# Patient Record
Sex: FEMALE | Race: WHITE | NOT HISPANIC OR LATINO | Employment: FULL TIME | URBAN - METROPOLITAN AREA
[De-identification: names, ages, dates, MRNs, and addresses within clinical notes are randomized per-mention and may not be internally consistent; named-entity substitution may affect disease eponyms.]

---

## 2017-03-07 ENCOUNTER — RECORDS - HEALTHEAST (OUTPATIENT)
Dept: LAB | Facility: HOSPITAL | Age: 42
End: 2017-03-07

## 2017-03-07 LAB — HBV SURFACE AB SERPL IA-ACNC: POSITIVE M[IU]/ML

## 2018-02-07 ENCOUNTER — COMMUNICATION - HEALTHEAST (OUTPATIENT)
Dept: SCHEDULING | Facility: CLINIC | Age: 43
End: 2018-02-07

## 2018-03-05 ENCOUNTER — OFFICE VISIT - HEALTHEAST (OUTPATIENT)
Dept: FAMILY MEDICINE | Facility: CLINIC | Age: 43
End: 2018-03-05

## 2018-03-05 ENCOUNTER — COMMUNICATION - HEALTHEAST (OUTPATIENT)
Dept: TELEHEALTH | Facility: CLINIC | Age: 43
End: 2018-03-05

## 2018-03-05 DIAGNOSIS — H90.5 CONGENITAL HEARING LOSS: ICD-10-CM

## 2018-03-05 DIAGNOSIS — Z15.09 BRCA POSITIVE: ICD-10-CM

## 2018-03-05 DIAGNOSIS — Z15.01 BRCA POSITIVE: ICD-10-CM

## 2018-05-01 ENCOUNTER — OFFICE VISIT - HEALTHEAST (OUTPATIENT)
Dept: FAMILY MEDICINE | Facility: CLINIC | Age: 43
End: 2018-05-01

## 2018-05-01 DIAGNOSIS — R23.2 HOT FLASHES: ICD-10-CM

## 2018-05-01 DIAGNOSIS — Z00.00 ROUTINE GENERAL MEDICAL EXAMINATION AT A HEALTH CARE FACILITY: ICD-10-CM

## 2018-05-01 DIAGNOSIS — Z12.31 VISIT FOR SCREENING MAMMOGRAM: ICD-10-CM

## 2018-05-01 DIAGNOSIS — Z23 NEED FOR TDAP VACCINATION: ICD-10-CM

## 2018-05-01 LAB
ALBUMIN UR-MCNC: NEGATIVE MG/DL
APPEARANCE UR: CLEAR
BILIRUB UR QL STRIP: NEGATIVE
CHOLEST SERPL-MCNC: 171 MG/DL
COLOR UR AUTO: YELLOW
FASTING STATUS PATIENT QL REPORTED: NO
GLUCOSE UR STRIP-MCNC: NEGATIVE MG/DL
HDLC SERPL-MCNC: 66 MG/DL
HGB BLD-MCNC: 14.4 G/DL (ref 12–16)
HGB UR QL STRIP: ABNORMAL
KETONES UR STRIP-MCNC: NEGATIVE MG/DL
LDLC SERPL CALC-MCNC: 82 MG/DL
LEUKOCYTE ESTERASE UR QL STRIP: NEGATIVE
NITRATE UR QL: NEGATIVE
PH UR STRIP: 7 [PH] (ref 5–8)
SP GR UR STRIP: 1.01 (ref 1–1.03)
TRIGL SERPL-MCNC: 113 MG/DL
UROBILINOGEN UR STRIP-ACNC: ABNORMAL

## 2018-05-02 ENCOUNTER — COMMUNICATION - HEALTHEAST (OUTPATIENT)
Dept: FAMILY MEDICINE | Facility: CLINIC | Age: 43
End: 2018-05-02

## 2018-05-03 LAB
HPV SOURCE: NORMAL
HUMAN PAPILLOMA VIRUS 16 DNA: NEGATIVE
HUMAN PAPILLOMA VIRUS 18 DNA: NEGATIVE
HUMAN PAPILLOMA VIRUS FINAL DIAGNOSIS: NORMAL
HUMAN PAPILLOMA VIRUS OTHER HR: NEGATIVE
SPECIMEN DESCRIPTION: NORMAL

## 2018-08-06 ENCOUNTER — COMMUNICATION - HEALTHEAST (OUTPATIENT)
Dept: FAMILY MEDICINE | Facility: CLINIC | Age: 43
End: 2018-08-06

## 2018-08-06 DIAGNOSIS — R23.2 HOT FLASHES: ICD-10-CM

## 2018-08-08 ENCOUNTER — COMMUNICATION - HEALTHEAST (OUTPATIENT)
Dept: FAMILY MEDICINE | Facility: CLINIC | Age: 43
End: 2018-08-08

## 2018-08-22 ENCOUNTER — COMMUNICATION - HEALTHEAST (OUTPATIENT)
Dept: FAMILY MEDICINE | Facility: CLINIC | Age: 43
End: 2018-08-22

## 2018-08-22 DIAGNOSIS — Z15.09 BRCA POSITIVE: ICD-10-CM

## 2018-08-22 DIAGNOSIS — Z15.01 BRCA POSITIVE: ICD-10-CM

## 2018-09-12 ENCOUNTER — HOSPITAL ENCOUNTER (OUTPATIENT)
Dept: MAMMOGRAPHY | Facility: CLINIC | Age: 43
Discharge: HOME | End: 2018-09-12
Attending: FAMILY MEDICINE

## 2018-09-12 DIAGNOSIS — Z15.01 BRCA POSITIVE: ICD-10-CM

## 2018-09-12 DIAGNOSIS — Z15.09 BRCA POSITIVE: ICD-10-CM

## 2018-09-12 DIAGNOSIS — Z15.02 GENETIC SUSCEPTIBILITY TO MALIGNANT NEOPLASM OF OVARY: ICD-10-CM

## 2018-11-20 ENCOUNTER — COMMUNICATION - HEALTHEAST (OUTPATIENT)
Dept: FAMILY MEDICINE | Facility: CLINIC | Age: 43
End: 2018-11-20

## 2018-11-20 DIAGNOSIS — R23.2 HOT FLASHES: ICD-10-CM

## 2018-11-28 ENCOUNTER — COMMUNICATION - HEALTHEAST (OUTPATIENT)
Dept: FAMILY MEDICINE | Facility: CLINIC | Age: 43
End: 2018-11-28

## 2019-03-08 ENCOUNTER — COMMUNICATION - HEALTHEAST (OUTPATIENT)
Dept: SCHEDULING | Facility: CLINIC | Age: 44
End: 2019-03-08

## 2019-03-20 ENCOUNTER — COMMUNICATION - HEALTHEAST (OUTPATIENT)
Dept: TELEHEALTH | Facility: CLINIC | Age: 44
End: 2019-03-20

## 2019-03-20 ENCOUNTER — OFFICE VISIT - HEALTHEAST (OUTPATIENT)
Dept: FAMILY MEDICINE | Facility: CLINIC | Age: 44
End: 2019-03-20

## 2019-03-20 DIAGNOSIS — N64.4 BREAST PAIN, RIGHT: ICD-10-CM

## 2019-03-20 DIAGNOSIS — Z15.01 BRCA POSITIVE: ICD-10-CM

## 2019-03-20 DIAGNOSIS — Z15.09 BRCA POSITIVE: ICD-10-CM

## 2019-03-22 ENCOUNTER — HOSPITAL ENCOUNTER (OUTPATIENT)
Dept: ULTRASOUND IMAGING | Facility: CLINIC | Age: 44
Discharge: HOME | End: 2019-03-22
Attending: FAMILY MEDICINE

## 2019-03-22 ENCOUNTER — HOSPITAL ENCOUNTER (OUTPATIENT)
Dept: MAMMOGRAPHY | Facility: CLINIC | Age: 44
Discharge: HOME | End: 2019-03-22
Attending: FAMILY MEDICINE

## 2019-03-22 DIAGNOSIS — Z15.09 BRCA POSITIVE: ICD-10-CM

## 2019-03-22 DIAGNOSIS — Z15.01 BRCA POSITIVE: ICD-10-CM

## 2019-03-22 DIAGNOSIS — N64.4 BREAST PAIN, RIGHT: ICD-10-CM

## 2019-03-23 ENCOUNTER — COMMUNICATION - HEALTHEAST (OUTPATIENT)
Dept: FAMILY MEDICINE | Facility: CLINIC | Age: 44
End: 2019-03-23

## 2019-12-26 ENCOUNTER — COMMUNICATION - HEALTHEAST (OUTPATIENT)
Dept: FAMILY MEDICINE | Facility: CLINIC | Age: 44
End: 2019-12-26

## 2019-12-26 DIAGNOSIS — R23.2 HOT FLASHES: ICD-10-CM

## 2020-01-21 ENCOUNTER — OFFICE VISIT - HEALTHEAST (OUTPATIENT)
Dept: FAMILY MEDICINE | Facility: CLINIC | Age: 45
End: 2020-01-21

## 2020-01-21 DIAGNOSIS — Z00.00 ROUTINE GENERAL MEDICAL EXAMINATION AT A HEALTH CARE FACILITY: ICD-10-CM

## 2020-01-21 DIAGNOSIS — R23.2 HOT FLASHES: ICD-10-CM

## 2020-01-21 DIAGNOSIS — Z15.01 BRCA POSITIVE: ICD-10-CM

## 2020-01-21 DIAGNOSIS — Z15.09 BRCA POSITIVE: ICD-10-CM

## 2020-01-21 DIAGNOSIS — Z12.31 VISIT FOR SCREENING MAMMOGRAM: ICD-10-CM

## 2020-01-21 LAB
ALBUMIN UR-MCNC: NEGATIVE MG/DL
APPEARANCE UR: CLEAR
BILIRUB UR QL STRIP: NEGATIVE
CHOLEST SERPL-MCNC: 198 MG/DL
COLOR UR AUTO: YELLOW
FASTING STATUS PATIENT QL REPORTED: NO
GLUCOSE UR STRIP-MCNC: NEGATIVE MG/DL
HDLC SERPL-MCNC: 81 MG/DL
HGB BLD-MCNC: 15.3 G/DL (ref 12–16)
HGB UR QL STRIP: ABNORMAL
KETONES UR STRIP-MCNC: NEGATIVE MG/DL
LDLC SERPL CALC-MCNC: 97 MG/DL
LEUKOCYTE ESTERASE UR QL STRIP: NEGATIVE
NITRATE UR QL: NEGATIVE
PH UR STRIP: 6 [PH] (ref 5–8)
SP GR UR STRIP: 1.01 (ref 1–1.03)
TRIGL SERPL-MCNC: 99 MG/DL
UROBILINOGEN UR STRIP-ACNC: ABNORMAL

## 2020-01-21 ASSESSMENT — MIFFLIN-ST. JEOR: SCORE: 1427.84

## 2020-01-23 ENCOUNTER — COMMUNICATION - HEALTHEAST (OUTPATIENT)
Dept: FAMILY MEDICINE | Facility: CLINIC | Age: 45
End: 2020-01-23

## 2020-01-29 ENCOUNTER — COMMUNICATION - HEALTHEAST (OUTPATIENT)
Dept: ADMINISTRATIVE | Facility: CLINIC | Age: 45
End: 2020-01-29

## 2020-02-02 ENCOUNTER — COMMUNICATION - HEALTHEAST (OUTPATIENT)
Dept: FAMILY MEDICINE | Facility: CLINIC | Age: 45
End: 2020-02-02

## 2020-02-02 DIAGNOSIS — R23.2 HOT FLASHES: ICD-10-CM

## 2020-09-10 ENCOUNTER — RECORDS - HEALTHEAST (OUTPATIENT)
Dept: ADMINISTRATIVE | Facility: OTHER | Age: 45
End: 2020-09-10

## 2021-02-05 ENCOUNTER — OFFICE VISIT - HEALTHEAST (OUTPATIENT)
Dept: FAMILY MEDICINE | Facility: CLINIC | Age: 46
End: 2021-02-05

## 2021-02-05 DIAGNOSIS — R23.2 HOT FLASHES: ICD-10-CM

## 2021-02-05 DIAGNOSIS — Z15.01 BRCA POSITIVE: ICD-10-CM

## 2021-02-05 DIAGNOSIS — Z15.09 BRCA POSITIVE: ICD-10-CM

## 2021-02-05 DIAGNOSIS — Z00.00 ROUTINE GENERAL MEDICAL EXAMINATION AT A HEALTH CARE FACILITY: ICD-10-CM

## 2021-02-05 DIAGNOSIS — H90.5 CONGENITAL HEARING LOSS: ICD-10-CM

## 2021-02-05 LAB
ALBUMIN UR-MCNC: NEGATIVE MG/DL
APPEARANCE UR: CLEAR
BILIRUB UR QL STRIP: NEGATIVE
CHOLEST SERPL-MCNC: 184 MG/DL
COLOR UR AUTO: YELLOW
FASTING STATUS PATIENT QL REPORTED: NO
GLUCOSE UR STRIP-MCNC: NEGATIVE MG/DL
HDLC SERPL-MCNC: 75 MG/DL
HGB BLD-MCNC: 15.2 G/DL (ref 12–16)
HGB UR QL STRIP: ABNORMAL
KETONES UR STRIP-MCNC: NEGATIVE MG/DL
LDLC SERPL CALC-MCNC: 91 MG/DL
LEUKOCYTE ESTERASE UR QL STRIP: NEGATIVE
NITRATE UR QL: NEGATIVE
PH UR STRIP: 5.5 [PH] (ref 5–8)
SP GR UR STRIP: 1.01 (ref 1–1.03)
TRIGL SERPL-MCNC: 92 MG/DL
UROBILINOGEN UR STRIP-ACNC: ABNORMAL

## 2021-02-05 RX ORDER — MULTIPLE VITAMINS W/ MINERALS TAB 9MG-400MCG
1 TAB ORAL DAILY
Status: SHIPPED | COMMUNITY
Start: 2021-02-05 | End: 2022-04-26

## 2021-02-05 RX ORDER — NORETHINDRONE ACETATE AND ETHINYL ESTRADIOL .02; 1 MG/1; MG/1
TABLET ORAL
Qty: 84 TABLET | Refills: 4 | Status: SHIPPED | OUTPATIENT
Start: 2021-02-05 | End: 2022-02-23

## 2021-02-05 ASSESSMENT — MIFFLIN-ST. JEOR: SCORE: 1411.63

## 2021-02-16 ENCOUNTER — HOSPITAL ENCOUNTER (OUTPATIENT)
Dept: MAMMOGRAPHY | Facility: CLINIC | Age: 46
Discharge: HOME | End: 2021-02-16
Attending: FAMILY MEDICINE

## 2021-02-16 DIAGNOSIS — Z12.31 VISIT FOR SCREENING MAMMOGRAM: ICD-10-CM

## 2021-03-19 ENCOUNTER — COMMUNICATION - HEALTHEAST (OUTPATIENT)
Dept: FAMILY MEDICINE | Facility: CLINIC | Age: 46
End: 2021-03-19

## 2021-03-19 DIAGNOSIS — B00.1 COLD SORE: ICD-10-CM

## 2021-05-26 NOTE — TELEPHONE ENCOUNTER
I called and left message for patient to call back.     I was calling to make sure that she was aware of the results of her recent mammogram which were entirely negative.  I want to make sure that she did not have any other further questions regarding these results.

## 2021-05-27 ENCOUNTER — RECORDS - HEALTHEAST (OUTPATIENT)
Dept: ADMINISTRATIVE | Facility: CLINIC | Age: 46
End: 2021-05-27

## 2021-05-27 NOTE — PROGRESS NOTES
PROGRESS NOTE   3/20/2019    SUBJECTIVE:  Marcelina Mcginnis is a 43 y.o. female  who presents for   Chief Complaint   Patient presents with     Breast Pain     Right breast pain/pressure     Patient comes in today because of some right breast pain.  She had a shooting pain in her right breast about 2 weeks ago.  It happened once and never happened again.  He has not had any other problems with pain in that right breast again but she is concerned because of the fact that she is positive for BRCA and is worried that this might represent a sign of cancer or other breast abnormality.  She notes that she has not had any further pain in her breast but 2 days ago she started feeling some pressure in that right breast.  Again this lasted for a very short time and now is gone.  She totally is feeling absolutely fine at this point but again is concerned over the fact that she is positive for BRCA and she had these breast symptoms.  She has had both of her ovaries removed so she does not have any hormones naturally produced in her body.  She is on Microgestin on a continual basis.  She had a mammogram in November which was entirely normal.    Patient Active Problem List   Diagnosis     BRCA positive     Congenital hearing loss       Current Outpatient Medications   Medication Sig Dispense Refill     MICROGESTIN 1/20, 21, 1-20 mg-mcg per tablet TK 1 T PO ONCE D.  START A NEW PACK Q 3 WEEKS.. 4 Package 4     No current facility-administered medications for this visit.        No Known Allergies    Past Medical History:   Diagnosis Date     BRCA positive     needs yearly mammo, last one done fall 2017     Congenital hearing loss     wears bilateral hearing aids       Past Surgical History:   Procedure Laterality Date     BILATERAL OOPHORECTOMY      due to BRCA positive gene     OOPHORECTOMY         Social History     Tobacco Use   Smoking Status Former Smoker     Packs/day: 0.50     Years: 10.00     Pack years: 5.00   Smokeless  Tobacco Never Used       OBJECTIVE:     BP 99/64   Pulse 73   Temp 98.1  F (36.7  C)   Wt 163 lb (73.9 kg)   SpO2 99%     Physical Exam:  GENERAL APPEARANCE: A&A, NAD, well hydrated, well nourished  SKIN:  Normal skin turgor, no lesions/rashes   Breast exam was done.  No masses were appreciated bilaterally.  No nipple discharge was appreciated.  No axillary masses were appreciated.  No dimpling or pulling of the skin was appreciated with patient sitting upright.  CV: RRR, no M/G/R   LUNGS: CTAB  EXTREMITY: no swelling noted.  Full range of motion of all 4 extremities.   NEURO: no gross deficits   PSYCHIATRIC;  Mood appropriate, memory intact        ASSESSMENT/PLAN:     Breast pain, right [N64.4]      1. Breast pain, right  -right diagnostic mammogram  - US Breast Limited (Focal) Right; Future    2. BRCA positive  -right diagnostic mammogram  - US Breast Limited (Focal) Right; Future    Patient comes in today with some right breast pain that she had one time a couple weeks ago and then some pressure in her right breast a couple of days ago.  Both of these symptoms have now resolved.  She is feeling fine and is not having symptoms currently but is concerned because of the fact that she is BRCA positive and certainly at increased risk for breast cancer.  She had a mammogram about 4 months ago that was entirely normal.  I think because of the symptoms that she is having and the fact that she is BRCA positive we should go ahead and get a diagnostic mammogram on the right side as well as an ultrasound.  These were both ordered today.  Someone from the office should contact her regarding getting this scheduled.  I do not feel anything on exam today that would be suggestive of a mass or other abnormality and patient was quite reassured by that.  We discussed that I think is important that we get the mammogram now to rule out any other kind of abnormality especially given the fact that she is at such high risk with her  positive BRCA.  Patient is in agreement with the plan.  If she does not hear from someone in terms of scheduling this will certainly let me know.  We otherwise will see her on an as-needed basis or for her next physical exam.  Nargis Hurley MD

## 2021-05-27 NOTE — TELEPHONE ENCOUNTER
Per mammogram report all results were discussed with the patient at time of visit.  I tried on numerous occasions to get a hold the patient to confirm that she did not have any further questions and could not get an answer.  Did leave her message stating if she has additional questions or concerns she should give us a call back.

## 2021-05-29 ENCOUNTER — HEALTH MAINTENANCE LETTER (OUTPATIENT)
Age: 46
End: 2021-05-29

## 2021-06-01 VITALS — WEIGHT: 163.38 LBS

## 2021-06-01 VITALS — WEIGHT: 167 LBS

## 2021-06-02 VITALS — WEIGHT: 163 LBS

## 2021-06-04 VITALS
SYSTOLIC BLOOD PRESSURE: 121 MMHG | DIASTOLIC BLOOD PRESSURE: 79 MMHG | BODY MASS INDEX: 23.85 KG/M2 | WEIGHT: 161 LBS | OXYGEN SATURATION: 100 % | HEIGHT: 69 IN | HEART RATE: 62 BPM

## 2021-06-04 NOTE — TELEPHONE ENCOUNTER
Please call her and let her know that we did refill her medication for 30 days however she needs to be seen for physical exam prior to any further refills.     Please assist patient with scheduling this appointment at her earliest convenience.

## 2021-06-05 VITALS
BODY MASS INDEX: 23.55 KG/M2 | OXYGEN SATURATION: 99 % | WEIGHT: 159 LBS | HEART RATE: 64 BPM | DIASTOLIC BLOOD PRESSURE: 86 MMHG | SYSTOLIC BLOOD PRESSURE: 130 MMHG | HEIGHT: 69 IN

## 2021-06-05 NOTE — TELEPHONE ENCOUNTER
Refill Approved    Rx renewed per Medication Renewal Policy. Medication was last renewed on 1/21/20.    Danisha Molina, [facility] Triage/Med Refill 2/2/2020     Requested Prescriptions   Pending Prescriptions Disp Refills     norethindrone-ethinyl estradiol (JUNEL 1/20, 21,) 1-20 mg-mcg per tablet [Pharmacy Med Name: JUNEL 1 MG-20 MCG TABLET] 42 tablet 0     Sig: TAKE 1 TABELT BY MOUTH DAILY. START A NEW PACK EVERY 3 WEEKS       Oral Contraceptives Protocol Passed - 2/2/2020  1:00 AM        Passed - Visit with PCP or prescribing provider visit in last 12 months      Last office visit with prescriber/PCP: 3/20/2019 Nargis Hurley MD OR same dept: 3/20/2019 Nargis Hurley MD OR same specialty: 3/20/2019 Nargis Hurley MD  Last physical: 1/21/2020 Last MTM visit: Visit date not found   Next visit within 3 mo: Visit date not found  Next physical within 3 mo: Visit date not found  Prescriber OR PCP: Nargis Hurley MD  Last diagnosis associated with med order: 1. Hot flashes  - JUNEL 1/20, 21, 1-20 mg-mcg per tablet [Pharmacy Med Name: JUNEL 1 MG-20 MCG TABLET]; TAKE 1 TABELT BY MOUTH DAILY. START A NEW PACK EVERY 3 WEEKS  Dispense: 42 tablet; Refill: 0    If protocol passes may refill for 12 months if within 3 months of last provider visit (or a total of 15 months).

## 2021-06-05 NOTE — PROGRESS NOTES
Assessment:      Healthy female exam.      Plan:       All questions answered.  Await pap smear results.      ASSESSMENT: 44 y.o. female physical exam.     PLAN:     Routine general medical examination at a health care facility [Z00.00]    1. Routine general medical examination at a health care facility  - Hemoglobin  - Urinalysis Macroscopic  - Lipid Cascade    2. BRCA positive    3. Hot flashes  - MICROGESTIN 1/20, 21, 1-20 mg-mcg per tablet; TK 1 T PO ONCE D.  START A NEW PACK Q 3 WEEKS.  Dispense: 3 Package; Refill: 5    4. Visit for screening mammogram  - Mammo Screening Bilateral; Future      Patient is a 44 y.o. female who is overall doing well.  She continues on birth control pills for control of her hot flashes.  Refill of those were given today.  She does use these on a continual basis.  She was given information on how to go about scheduling a mammogram.  I also placed an order for that.  She is due for mammogram in mid March.  She declined pelvic exam or Pap smear today.  She overall feels like things are doing well.  All of her questions and concerns were addressed today.  If she has additional problems or concerns should let me know.    Will contact her with the results of the labs when available.  Nargis Hurley M.D.       Subjective:      Marcelina Mcginnis is a 44 y.o. female who presents for an annual exam. The patient is sexually active. The patient participates in regular exercise: yes. The patient reports that there is not domestic violence in her life.  She does have a history of being positive for BRCA and therefore has had an oophorectomy.  She is feeling well.  She continues on birth control pills which are working well.  She uses these on a continual basis and starts a new pack every 3 weeks.  This medication has been working well for her for quite some time.  She primarily uses the birth control pills for control of her hot flashes that she is had a bilateral oophorectomy because  of the BRCA positivity.  She declines any pelvic exam today.  She is due for mammogram in March and will place an order and someone should contact her regarding getting that scheduled.  I also did give her a phone number that she can call to schedule at her self.  She is up-to-date with her immunizations.    Healthy Habits:   Regular Exercise: Yes  Sunscreen Use: Yes  Healthy Diet: Yes  Dental Visits Regularly: Yes  Seat Belt: Yes  Sexually active: Yes  Self Breast Exam Monthly:Yes  Hemoccults: N/A  Flex Sig: N/A  Colonoscopy: N/A  Lipid Profile: N/A  Glucose Screen: N/A  Prevention of Osteoporosis: N/A   Last Dexa: No  Guns at Home:  No      Immunization History   Administered Date(s) Administered     Hep A, Adult IM (19yr & older) 2008, 2010     Influenza, inj, historic,unspecified 2019     Influenza,seasonal quad, PF, =/> 6months 2014, 10/21/2015, 2015, 2016, 2017     Influenza,seasonal, Inj IIV3 10/15/2016     Td, Adult, Absorbed 2001     Tdap 2008, 2018     Immunization status: up to date and documented.    No exam data present    Gynecologic History  No LMP recorded. (Menstrual status: Oopherectomy).  Contraception: Oopherectomy  Last Pap: 5/10/2018. Results were: normal  Last mammogram: 3/20/2019 . Results were: normal      OB History    Para Term  AB Living   0 0 0 0 0 0   SAB TAB Ectopic Multiple Live Births   0 0 0 0 0       Current Outpatient Medications   Medication Sig Dispense Refill     MICROGESTIN , , 1-20 mg-mcg per tablet TK 1 T PO ONCE D.  START A NEW PACK Q 3 WEEKS. 3 Package 5     No current facility-administered medications for this visit.      Past Medical History:   Diagnosis Date     BRCA positive     needs yearly mammo, last one done 2017     Congenital hearing loss     wears bilateral hearing aids     Past Surgical History:   Procedure Laterality Date     BILATERAL OOPHORECTOMY      due to BRCA positive  gene     Patient has no known allergies.  Family History   Problem Relation Age of Onset     BRCA 1/2 Mother         positive for gene     Heart disease Father         MI age 42     Ovarian cancer Maternal Grandmother 54     Heart disease Paternal Grandfather         MI, age 42     Social History     Socioeconomic History     Marital status:      Spouse name: Michel      Number of children: 0     Years of education: Not on file     Highest education level: Not on file   Occupational History     Occupation: histology technologist   Social Needs     Financial resource strain: Not on file     Food insecurity:     Worry: Not on file     Inability: Not on file     Transportation needs:     Medical: Not on file     Non-medical: Not on file   Tobacco Use     Smoking status: Former Smoker     Packs/day: 0.50     Years: 10.00     Pack years: 5.00     Smokeless tobacco: Never Used   Substance and Sexual Activity     Alcohol use: Yes     Alcohol/week: 2.0 standard drinks     Types: 2 Standard drinks or equivalent per week     Frequency: 2-4 times a month     Drinks per session: 3 or 4     Binge frequency: Less than monthly     Drug use: No     Sexual activity: Yes     Partners: Male     Birth control/protection: Surgical     Comment: has had ovaries removed   Lifestyle     Physical activity:     Days per week: Not on file     Minutes per session: Not on file     Stress: Not on file   Relationships     Social connections:     Talks on phone: Not on file     Gets together: Not on file     Attends [employer] service: Not on file     Active member of club or organization: Not on file     Attends meetings of clubs or organizations: Not on file     Relationship status: Not on file     Intimate partner violence:     Fear of current or ex partner: Not on file     Emotionally abused: Not on file     Physically abused: Not on file     Forced sexual activity: Not on file   Other Topics Concern     Not on file   Social History  "Narrative     Not on file               Objective:         Vitals:    01/21/20 1432   BP: 121/79   Pulse: 62   SpO2: 100%   Weight: 161 lb (73 kg)   Height: 5' 9.2\" (1.758 m)     Body mass index is 23.64 kg/m .       REVIEW OF SYSTEMS:   Denies fever, chills, visual changes, fatigue, myalgias, nasal congestion, rhinorrhea, ear pain or discharge, sore throat, swollen glands, breast mass, nipple discharge, breast changes, abdominal pain, nausea, vomiting, diarrhea, constipation, cough, shortness of breath, chest pain, weight change, change in bowel habits, melena, rectal bleeding, dysuria, frequency, urgency, hematuria, polyuria, polydipsia, polyphagia, joint pain or swelling or erythema, edema, rash, weakness, paresthesias, vaginal discharge or bleeding or mood changes.  Remainder of review of systems was negative.      PHYSICAL EXAM:  On exam, patient is a WD, WN 44 y.o. female in NAD.  /79   Pulse 62   Ht 5' 9.2\" (1.758 m)   Wt 161 lb (73 kg)   SpO2 100%   BMI 23.64 kg/m    Head normocephalic, atraumatic.  Eyes PERRL, ears TM s clear bilaterally.  Throat without significant erythemia or exudate.  Neck was supple, full range of motion. No significant lymphadenopathy or thyromegaly was appreciated.  Lungs clear to auscultation  Heart regular rate and rhythm.  Breast exam was done. No masses were appreciated. Axilla were clear bilaterally. No nipple discharge was appreciated. Self breast exam was reviewed and taught today.  Abdomen was soft, nontender, nondistended. No masses or organomegaly were palpated. Positive bowel sounds were appreciated.  Extremities with full range of motion of all 4 extremities were noted.  Deep tendon reflexes were equal and symmetrical. Motor and sensation were intact to both the upper and lower extremities.  Cranial nerves 2 through 12 were grossly intact.  EOM were intact.  Patient declined pelvic exam and Pap smear today.    Recent Results (from the past 240 hour(s)) "   Hemoglobin   Result Value Ref Range    Hemoglobin 15.3 12.0 - 16.0 g/dL   Lipid Cascade   Result Value Ref Range    Cholesterol 198 <=199 mg/dL    Triglycerides 99 <=149 mg/dL    HDL Cholesterol 81 >=50 mg/dL    LDL Calculated 97 <=129 mg/dL    Patient Fasting > 8hrs? No    Urinalysis Macroscopic   Result Value Ref Range    Color, UA Yellow Colorless, Yellow, Straw, Light Yellow    Clarity, UA Clear Clear    Glucose, UA Negative Negative    Bilirubin, UA Negative Negative    Ketones, UA Negative Negative    Specific Gravity, UA 1.010 1.005 - 1.030    Blood, UA Trace (!) Negative    pH, UA 6.0 5.0 - 8.0    Protein, UA Negative Negative mg/dL    Urobilinogen, UA 0.2 E.U./dL 0.2 E.U./dL, 1.0 E.U./dL    Nitrite, UA Negative Negative    Leukocytes, UA Negative Negative

## 2021-06-15 NOTE — PROGRESS NOTES
Assessment:      Healthy female exam.      Plan:       All questions answered.      ASSESSMENT: 45 y.o. female physical exam.     PLAN:     Routine general medical examination at a health care facility [Z00.00]    1. Routine general medical examination at a health care facility  - Hemoglobin  - Urinalysis Macroscopic  - Lipid Cascade RANDOM    2. Hot flashes  - norethindrone-ethinyl estradiol (JUNEL 1/20, 21,) 1-20 mg-mcg per tablet; TAKE 1 TABELT BY MOUTH DAILY. START A NEW PACK EVERY 3 WEEKS  Dispense: 84 tablet; Refill: 4    3. BRCA positive    4. Congenital hearing loss      Patient is a 45 y.o. female who is overall doing well.  Patient does have a history of being BRCA positive and has had oophorectomy.  She does need mammograms on a yearly basis and is scheduled for next mammogram next 10 days or so.  She does continue on birth control pills which help control hot flashes and her mood swings.  She would very much like a refill of those today.  Refill of her birth control was sent to the pharmacy today.  3-month supply was given with 4 refills which should take her through the next year.  She does have a history of congenital hearing loss but that seems to be relatively stable.  She continues to wear hearing aids on a full-time basis.  She declined Pap and pelvic exam today.  We will need to do that next year.  All of her questions and concerns were addressed today.  If she has additional problems or concerns should let me know.    Will contact her with the results of the labs when available.    Voice recognition software was used in the creation of this note. Any grammatical or nonsensical errors are due to inherent errors with the software and are not the intention of the writer.      Nargis Hurley M.D.       Subjective:      Marcelina Mcginnis is a 45 y.o. female who presents for an annual exam. The patient is sexually active. The patient participates in regular exercise: yes. The patient reports  that there is not domestic violence in her life.  Well is feeling very well.  She does have a history of being BRCA positive and so does need a yearly mammogram.  This is scheduled for later this month on 2/16/2021.  She has had an oophorectomy as a result of being BRCA positive as well.  She overall feels like things are doing well.  She continues on birth control pills which are primarily to help her with her hot flashes.  She also has hormonal mood swings that the birth control pill seem to help with as well.  She would very much like to continue on those.  She does need a refill of those today.  She declined Pap smear and pelvic exam today.  She overall is doing well and has no other concerns.  She does note that she was little constipated a couple of weeks ago and when she wiped had some blood after she had a bowel movement but since then has been fine.  Her bowel movements have returned back to normal and she does not feel constipated any longer.  She does have a history of congenital hearing loss and does wear hearing aids.    Healthy Habits:   Regular Exercise: Yes  Sunscreen Use: No  Healthy Diet: Yes  Dental Visits Regularly: Yes  Seat Belt: Yes  Sexually active: Yes  Self Breast Exam Monthly:Yes  Hemoccults: No  Flex Sig: No  Colonoscopy: No  Lipid Profile: Yes  Glucose Screen: Yes  Prevention of Osteoporosis: Yes and Takes a multivitamin  Last Dexa: No  Guns at Home:  No      Immunization History   Administered Date(s) Administered     COVID-19,PF,Pfizer 12/28/2020, 01/15/2021     Hep A, Adult IM (19yr & older) 02/19/2008, 11/08/2010     INFLUENZA,SEASONAL QUAD, PF, =/> 6months 11/11/2014, 10/21/2015, 11/16/2015, 09/28/2016, 09/27/2017     Influenza, inj, historic,unspecified 11/13/2019     Influenza,seasonal, Inj IIV3 10/15/2016     Td, Adult, Absorbed 01/01/2001     Tdap 02/19/2008, 05/01/2018     Immunization status: up to date and documented.  She has already had a flu vaccination this season.  She  is also had Covid vaccinations.    No exam data present    Gynecologic History  No LMP recorded. (Menstrual status: Oopherectomy).  Contraception: OCP (estrogen/progesterone)  Last Pap: . Results were: normal  Last mammogram: 3/22/19. Results were: normal      OB History    Para Term  AB Living   0 0 0 0 0 0   SAB TAB Ectopic Multiple Live Births   0 0 0 0 0       Current Outpatient Medications   Medication Sig Dispense Refill     multivitamin with minerals (THERA-M) 9 mg iron-400 mcg Tab tablet Take 1 tablet by mouth daily.       norethindrone-ethinyl estradiol (JUNEL 1/20, ,) 1-20 mg-mcg per tablet TAKE 1 TABELT BY MOUTH DAILY. START A NEW PACK EVERY 3 WEEKS 84 tablet 4     No current facility-administered medications for this visit.      Past Medical History:   Diagnosis Date     BRCA positive     needs yearly mammo     Congenital hearing loss     wears bilateral hearing aids     Past Surgical History:   Procedure Laterality Date     BILATERAL OOPHORECTOMY      due to BRCA positive gene     Patient has no known allergies.  Family History   Problem Relation Age of Onset     BRCA 1/2 Mother         positive for gene     Heart disease Father         MI age 42     Ovarian cancer Maternal Grandmother 54     Heart disease Paternal Grandfather         MI, age 42     Social History     Socioeconomic History     Marital status:      Spouse name: Michel      Number of children: 0     Years of education: Not on file     Highest education level: Not on file   Occupational History     Occupation: histology technologist   Social Needs     Financial resource strain: Not on file     Food insecurity     Worry: Not on file     Inability: Not on file     Transportation needs     Medical: Not on file     Non-medical: Not on file   Tobacco Use     Smoking status: Former Smoker     Packs/day: 0.50     Years: 15.00     Pack years: 7.50     Smokeless tobacco: Never Used   Substance and Sexual Activity      "Alcohol use: Yes     Alcohol/week: 4.0 standard drinks     Types: 2 Glasses of wine, 2 Standard drinks or equivalent per week     Frequency: 2-4 times a month     Drinks per session: 3 or 4     Binge frequency: Less than monthly     Drug use: No     Sexual activity: Yes     Partners: Male     Birth control/protection: Surgical     Comment: has had ovaries removed   Lifestyle     Physical activity     Days per week: Not on file     Minutes per session: Not on file     Stress: Not on file   Relationships     Social connections     Talks on phone: Not on file     Gets together: Not on file     Attends Sabianism service: Not on file     Active member of club or organization: Not on file     Attends meetings of clubs or organizations: Not on file     Relationship status: Not on file     Intimate partner violence     Fear of current or ex partner: Not on file     Emotionally abused: Not on file     Physically abused: Not on file     Forced sexual activity: Not on file   Other Topics Concern     Not on file   Social History Narrative     Not on file               Objective:         Vitals:    02/05/21 1311   BP: 130/86   Pulse: 64   SpO2: 99%   Weight: 159 lb (72.1 kg)   Height: 5' 8.75\" (1.746 m)     Body mass index is 23.65 kg/m .     REVIEW OF SYSTEMS:   Denies fever, chills, visual changes, fatigue, myalgias, nasal congestion, rhinorrhea, ear pain or discharge, sore throat, swollen glands, breast mass, nipple discharge, breast changes, abdominal pain, nausea, vomiting, diarrhea, constipation, cough, shortness of breath, chest pain, weight change, change in bowel habits, melena, rectal bleeding, dysuria, frequency, urgency, hematuria, polyuria, polydipsia, polyphagia, joint pain or swelling or erythema, edema, rash, weakness, paresthesias, vaginal discharge or bleeding or mood changes other than that mentioned above in HPI.   Remainder of review of systems was negative.      PHYSICAL EXAM:  On exam, patient is a WD, WN " "45 y.o. female in NAD.  /86   Pulse 64   Ht 5' 8.75\" (1.746 m)   Wt 159 lb (72.1 kg)   SpO2 99%   BMI 23.65 kg/m    Head normocephalic, atraumatic.  Eyes PERRL, ears TM s clear bilaterally.  Throat without significant erythemia or exudate.  Neck was supple, full range of motion. No significant lymphadenopathy or thyromegaly was appreciated.  Lungs clear to auscultation  Heart regular rate and rhythm.  Breast exam was done. No masses were appreciated. Axilla were clear bilaterally. No nipple discharge was appreciated.   Abdomen was soft, nontender, nondistended. No masses or organomegaly were palpated. Positive bowel sounds were appreciated.  Extremities with full range of motion of all 4 extremities were noted.  Deep tendon reflexes were equal and symmetrical. Motor and sensation were intact to both the upper and lower extremities.  Cranial nerves 2 through 12 were grossly intact.  EOM were intact.  Patient declined both Pap smear and pelvic exam today.    LABS:    Recent Results (from the past 240 hour(s))   Hemoglobin   Result Value Ref Range    Hemoglobin 15.2 12.0 - 16.0 g/dL   Lipid Cascade RANDOM   Result Value Ref Range    Cholesterol 184 <=199 mg/dL    Triglycerides 92 <=149 mg/dL    HDL Cholesterol 75 >=50 mg/dL    LDL Calculated 91 <=129 mg/dL    Patient Fasting > 8hrs? No    Urinalysis Macroscopic   Result Value Ref Range    Color, UA Yellow Colorless, Yellow, Straw, Light Yellow    Clarity, UA Clear Clear    Glucose, UA Negative Negative    Bilirubin, UA Negative Negative    Ketones, UA Negative Negative    Specific Gravity, UA 1.015 1.005 - 1.030    Blood, UA Trace (!) Negative    pH, UA 5.5 5.0 - 8.0    Protein, UA Negative Negative mg/dL    Urobilinogen, UA 0.2 E.U./dL 0.2 E.U./dL, 1.0 E.U./dL    Nitrite, UA Negative Negative    Leukocytes, UA Negative Negative        "

## 2021-06-16 NOTE — TELEPHONE ENCOUNTER
Appointment recommended?     Patient was last in for a physical exam with PCP on 2/5/21 but valacyclovir or cold sores is not mentioned.

## 2021-06-16 NOTE — TELEPHONE ENCOUNTER
Reason for Call:  Medication or medication refill:    Do you use a [facility]?  Name of the pharmacy and phone number for the current request: [facility]    Name of the medication requested: VALACYCLOVIR HCL 1GM. Pts last supply came from  years ago, needs refill now please.    Other request: pt would like for mouth sore    Can we leave a detailed message on this number? Yes    Phone number patient can be reached at: Cell number on file:    Telephone Information:   Mobile [phone]       Best Time: anytime    Call taken on 3/19/2021 at 1:51 PM by Eric Casanova

## 2021-06-16 NOTE — PROGRESS NOTES
PROGRESS NOTE   3/5/2018    SUBJECTIVE:  Marcelina Mcginnis is a 42 y.o. female  who presents for   Chief Complaint   Patient presents with     Establish Care     Patient comes in today to establish care.  She has been cared for previously by Dr. Marge Burris at [facility] in [place].  She had a insurance issue and therefore now needs to come to [facility] [place]. Patient is new patient to the clinic. Please see past medical history, surgical history, social history and family history, all of which were completed in their entirety today.  She is a very healthy female however does carry the BRCA gene.  She is getting yearly mammograms because of the fact that she is positive for the BRCA gene.  She is also had her ovaries removed for that same reason.  Once she had her ovaries removed she almost immediately started having hot flashes and now she is on birth control pills.  A low-dose birth control pill has been working very well for her.  She does have enough of those to get her through for about the next 6 months or so.  She was born hard of hearing and wears bilateral hearing aids.  She does not have any difficulties or concerns regarding her hearing at this time.    Patient Active Problem List   Diagnosis     BRCA positive     Congenital hearing loss       Current Outpatient Prescriptions   Medication Sig Dispense Refill     MICROGESTIN 1/20, 21, 1-20 mg-mcg per tablet TK 1 T PO ONCE D.  START A NEW PACK Q 3 WEEKS.  3     No current facility-administered medications for this visit.        No Known Allergies    Past Medical History:   Diagnosis Date     BRCA positive     needs yearly mammo, last one done fall 2017     Congenital hearing loss     wears bilateral hearing aids       Past Surgical History:   Procedure Laterality Date     BILATERAL OOPHORECTOMY      due to BRCA positive gene       History   Smoking Status     Former Smoker     Packs/day: 0.50     Years: 10.00   Smokeless Tobacco     Never Used        OBJECTIVE:     /76 (Patient Site: Left Arm, Patient Position: Sitting, Cuff Size: Adult Regular)  Pulse 60  Wt 167 lb (75.8 kg)  SpO2 99%  Breastfeeding? No    Physical Exam:  GENERAL APPEARANCE: A&A, NAD, well hydrated, well nourished  SKIN:  Normal skin turgor, no lesions/rashes   CV: RRR, no M/G/R   LUNGS: CTAB  EXTREMITY: no swelling noted.  Full range of motion of all 4 extremities.   NEURO: no gross deficits   PSYCHIATRIC;  Mood appropriate, memory intact      ASSESSMENT/PLAN:     BRCA positive [Z15.01, Z15.02]      1. BRCA positive    2. Congenital hearing loss    Patient overall seems to be doing very well.  She is a very healthy female who carries the BRCA gene.  It has been recommended that she have yearly mammograms we discussed the fact that she probably should get those at [facility] since that is where the breast center is located in they have the best technology to do so.  Her last mammogram was in the fall 2017 so she will not be due until the fall 2018.  She does continue on birth control pills which are working well for her.  She started these after she had her oophorectomy and developed hot flashes.  She has about 5 months left of the birth control pills and so when she is due to get a refill of that will need to come in for a physical exam and we discussed that today.  She does have congenital hearing loss which is stable.  She wears bilateral hearing aids and is not having any difficulties with that.  All of her questions were answered today.  We reviewed her medical history family history surgical history at length today.    Nargis Hurley MD

## 2021-06-17 NOTE — PROGRESS NOTES
Marcelina Mcginnis is a 43 y.o. here for a Pap smear and physical exam. Patient is overall doing well.  She notes that she thinks that she is having a bit of allergies.  She has had a clear runny nasal discharge about the last 2 or 3 weeks.  She has not had allergies in the past but she thinks that perhaps that because she is not really having any other symptoms that go along with it.  She does not feel ill in any way.  She denies that she is having any fever or congestion as per se or cough.  She is positive for the BRCA gene and is getting yearly mammograms.  She did have a mammogram in the fall 2017 so she will be due again in the fall 2018.  Apparently when she was found to be positive for the BRCA gene they recommended that she also have ultrasounds every 6 months but she declines that recommendation and will continue with the mammograms on a yearly basis.  She also continues on birth control pills which she has been on since she had her ovaries removed.  She started having tremendous hot flashes right after she had her ovaries removed and was started on low-dose birth control pills.  We talked about the risks and benefits of continuing on them and at this point she definitely feels like the benefits far outweigh the risks of the birth control pills and would like to continue on them.  She is on low-dose birth control pills and I think that is appropriate.  She does need Tdap vaccine today.    Healthy Habits:   Regular Exercise: Yes  Sunscreen Use: Yes  Healthy Diet: Yes  Dental Visits Regularly: Yes  Seat Belt: Yes  Sexually active: Yes  Self Breast Exam Monthly:No  Hemoccults: N/A  Flex Sig: N/A  Colonoscopy: No  Lipid Profile: Yes  Glucose Screen: Yes  Prevention of Osteoporosis: No  Last Dexa: No  Guns at Home:  No  Domestic Violence:  No    Current Outpatient Medications Include:    Current Outpatient Prescriptions:      MICROGESTIN 1/20, 21, 1-20 mg-mcg per tablet, TK 1 T PO ONCE D.  START A NEW PACK Q  3 WEEKS., Disp: 3 Package, Rfl: 4    Allergies:  No Known Allergies    Past Medical History:   Diagnosis Date     BRCA positive     needs yearly mammo, last one done fall 2017     Congenital hearing loss     wears bilateral hearing aids       Past Surgical History:   Procedure Laterality Date     BILATERAL OOPHORECTOMY      due to BRCA positive gene       Immunization History   Administered Date(s) Administered     Hep A, Adult IM (19yr & older) 02/19/2008, 11/08/2010     Influenza,seasonal quad, PF, 36+MOS 11/11/2014, 10/21/2015, 11/16/2015, 09/28/2016, 09/27/2017     Influenza,seasonal, Inj IIV3 10/15/2016     Td, Adult, Absorbed 01/01/2001     Tdap 02/19/2008, 05/01/2018       Family History   Problem Relation Age of Onset     BRCA 1/2 Mother      positive for gene     Heart disease Father      MI age 42     Ovarian cancer Maternal Grandmother 54     Heart disease Paternal Grandfather      MI, age 42       Social History     Social History     Marital status:      Spouse name: Michel      Number of children: 0     Years of education: N/A     Occupational History     histology technologist      Social History Main Topics     Smoking status: Former Smoker     Packs/day: 0.50     Years: 10.00     Smokeless tobacco: Never Used     Alcohol use 1.2 oz/week     2 Standard drinks or equivalent per week     Drug use: No     Sexual activity: Yes     Partners: Male     Birth control/ protection: Surgical      Comment: has had ovaries removed     Other Topics Concern     Not on file     Social History Narrative       Last cholesterol:   Lab Results   Component Value Date    CHOL 171 05/01/2018     Lab Results   Component Value Date    HDL 66 05/01/2018     Lab Results   Component Value Date    LDLCALC 82 05/01/2018     Lab Results   Component Value Date    TRIG 113 05/01/2018       Last mammogram: 11/19/2017, patient does get yearly mammograms due to being positive for BRCA gene.    Birth Control Method: Microgesttin  1/20, 1-20 mg-mcg tablet            High Risk/Behavior: none    PREVENTATIVE SERVICES  Preventative services were reviewed today, 5/1/2018    LMP: No LMP recorded. Patient is not currently having periods (Reason: Oopherectomy).  Menstrual Regularity:n/a   Flow: n/a    REVIEW OF SYSTEMS:  Denies fever, chills, visual changes, fatigue, myalgias, nasal congestion, rhinorrhea, ear pain or discharge, sore throat, swollen glands, breast mass, nipple discharge, breast changes, abdominal pain, nausea, vomiting, diarrhea, constipation, cough, shortness of breath, chest pain, weight change, change in bowel habits, melena, rectal bleeding, dysuria, frequency, urgency, hematuria, polyuria, polydipsia, polyphagia, joint pain or swelling or erythema, edema, rash, weakness, paresthesias, vaginal discharge or bleeding or mood changes.  Remainder of review of systems was negative.      PHYSICAL EXAM:  On exam, patient is a WD, WN 43 y.o. female in NAD.  /80 (Patient Position: Sitting, Cuff Size: Adult Regular)  Pulse 71  Resp 16  Wt 163 lb 6 oz (74.1 kg)  SpO2 98%  Head normocephalic, atraumatic.  Eyes PERRL, ears TM s clear bilaterally.  Throat without significant erythemia or exudate.  Neck was supple, full range of motion. No significant lymphadenopathy or thyromegaly was appreciated.  Lungs clear to auscultation  Heart regular rate and rhythm.  Breast exam was done. No masses were appreciated. Axilla were clear bilaterally. No nipple discharge was appreciated. Self breast exam was reviewed and taught today.  Abdomen was soft, nontender, nondistended. No masses or organomegaly were palpated. Positive bowel sounds were appreciated.  Extremities with full range of motion of all 4 extremities were noted.  Deep tendon reflexes were equal and symmetrical. Motor and sensation were intact to both the upper and lower extremities.  Cranial nerves 2 through 12 were grossly intact.  EOM were intact.  Pelvic exam was done.   External genitalia appeared normal. Speculum was introduced and the Pap smear obtained. Bimanual exam revealed uterus to be normal size and no other palpable masses appreciated.     Recent Results (from the past 240 hour(s))   HPV High Risk DNA Cervical   Result Value Ref Range    HPV Source Sure[facility]     HPV16 DNA Negative NEG    HPV18 DNA Negative NEG    Other HR HPV Negative NEG    Final Diagnosis SEE NOTES     Specimen Description Cervical Cells    Hemoglobin   Result Value Ref Range    Hemoglobin 14.4 12.0 - 16.0 g/dL   Urinalysis Macroscopic   Result Value Ref Range    Color, UA Yellow Colorless, Yellow, Straw, Light Yellow    Clarity, UA Clear Clear    Glucose, UA Negative Negative    Bilirubin, UA Negative Negative    Ketones, UA Negative Negative    Specific Gravity, UA 1.010 1.005 - 1.030    Blood, UA Trace (!) Negative    pH, UA 7.0 5.0 - 8.0    Protein, UA Negative Negative mg/dL    Urobilinogen, UA 0.2 E.U./dL 0.2 E.U./dL, 1.0 E.U./dL    Nitrite, UA Negative Negative    Leukocytes, UA Negative Negative   Lipid Cascade   Result Value Ref Range    Cholesterol 171 <=199 mg/dL    Triglycerides 113 <=149 mg/dL    HDL Cholesterol 66 >=50 mg/dL    LDL Calculated 82 <=129 mg/dL    Patient Fasting > 8hrs? No        ASSESSMENT: 43 y.o. female physical exam and pap smear.    PLAN:     Routine general medical examination at a health care facility [Z00.00]    1. Routine general medical examination at a health care facility  - Hemoglobin  - Urinalysis Macroscopic  - Lipid Cascade  - Gynecologic Cytology (PAP Smear)  - HPV High Risk DNA Cervical    2. Hot flashes  - MICROGESTIN 1/20, 21, 1-20 mg-mcg per tablet; TK 1 T PO ONCE D.  START A NEW PACK Q 3 WEEKS.  Dispense: 3 Package; Refill: 4    3. Visit for screening mammogram    4. Need for Tdap vaccination      Patient is a 43 y.o. female who is overall doing well.  She continues on birth control pills for hot flashes that started right after she had her oophorectomy.   She uses these on a continuous basis.  Refill of this medication was given to her today.  We discussed the risks and benefits of this especially given the fact that we know that she has BRCA gene however she feels that the benefits of this medication far outweigh the risks.  She does need get yearly mammograms and she is due again in the fall.  When she needs to schedule that she will let me know and I be happy to place an order for that.  They have previously recommended that she have breast ultrasounds as well as mammograms and patient declines that recommendation at this time.  She also declines having mastectomy at this time as well.  Tdap was given today prior to her leaving the clinic.  I do not see any evidence for abnormality suggestive of an infection in her upper respiratory tract.  I suspect that she probably does have allergies.  She is going to try either Claritin and Zyrtec or Allegra and see if 1 of those will help with her symptoms of the clear runny nasal discharge and if that is not helpful will certainly let me know.  All of her questions and concerns were addressed today.  If she has additional problems or concerns should let me know.    Will contact her with the results of the labs when available.  Nargis Hurley M.D.

## 2021-06-20 NOTE — LETTER
Letter by Nargis Hurley MD at      Author: Nargis Hurley MD Service: -- Author Type: --    Filed:  Encounter Date: 1/29/2020 Status: (Other)         Marcelina Mcginnis  [address] [place] [address]               January 29, 2020    Dear Marcelina:    Our records indicate that you are due for a mammogram.    In the [place], one in nine women will develop breast cancer during their lifetime. While there is no way to prevent breast cancer, early detection provides the best opportunity for curing it.    For women over the age of 40, the American Cancer Society recommends a yearly clinical breast exam and a yearly mammogram. These practices have saved thousands of lives. We need your help to ensure that you are receiving optimal medical care.    Please make an appointment for a mammogram at your earliest convenience. [phone]    Sincerely,        Nargis Hurley MD

## 2021-06-20 NOTE — LETTER
Letter by Nargis Hurley MD at      Author: Nargis Hurley MD Service: -- Author Type: --    Filed:  Encounter Date: 1/23/2020 Status: (Other)         Marcelina Mcginnis  [address] [place] [address]             January 23, 2020         Dear Ms. Mcginnis,    Below are the results from your recent visit:    Resulted Orders   Hemoglobin   Result Value Ref Range    Hemoglobin 15.3 12.0 - 16.0 g/dL   Urinalysis Macroscopic   Result Value Ref Range    Color, UA Yellow Colorless, Yellow, Straw, Light Yellow    Clarity, UA Clear Clear    Glucose, UA Negative Negative    Bilirubin, UA Negative Negative    Ketones, UA Negative Negative    Specific Gravity, UA 1.010 1.005 - 1.030    Blood, UA Trace (!) Negative    pH, UA 6.0 5.0 - 8.0    Protein, UA Negative Negative mg/dL    Urobilinogen, UA 0.2 E.U./dL 0.2 E.U./dL, 1.0 E.U./dL    Nitrite, UA Negative Negative    Leukocytes, UA Negative Negative   Lipid Cascade   Result Value Ref Range    Cholesterol 198 <=199 mg/dL    Triglycerides 99 <=149 mg/dL    HDL Cholesterol 81 >=50 mg/dL    LDL Calculated 97 <=129 mg/dL    Patient Fasting > 8hrs? No                     Your hemoglobin, urinalysis and cholesterol all look fine.    Please call with questions or contact us using [employer].    Sincerely,        Electronically signed by Nargis Hurley MD

## 2021-06-24 NOTE — TELEPHONE ENCOUNTER
RN triage   Call from pt   Pt states today she has had 2 episodes of R breast pain -- shooting pain -- lasts 30 seconds   No other symptoms  No fever - no redness - no streaks - no discharge - no lumps or dippling - no rash - no pain to touch   Pt had mammogram last fall   Per protocol = should be seen within 2 weeks - transferred to    Jeri Sommer RN [facility] RN triage      Reason for Disposition    Breast pain and cause is not known    Protocols used: BREAST SYMPTOMS-A-OH

## 2021-09-18 ENCOUNTER — HEALTH MAINTENANCE LETTER (OUTPATIENT)
Age: 46
End: 2021-09-18

## 2022-01-28 ENCOUNTER — TELEPHONE (OUTPATIENT)
Dept: FAMILY MEDICINE | Facility: CLINIC | Age: 47
End: 2022-01-28
Payer: COMMERCIAL

## 2022-01-28 NOTE — TELEPHONE ENCOUNTER
Called to follow up with pt. Positive PCR test on 1/4/22 [facility]. Had head cold type symptoms for 6 days. Had negative rapid test before returning back to work after 6 days. No current symptoms. Faxing over PCR test results.

## 2022-01-28 NOTE — TELEPHONE ENCOUNTER
Reason for Call:  Other letter for travel    Detailed comments: pt had covid starting 1/5/22 and will be going out of country 2/1/22 and needs a letter for travel stating she is clear to travel and may have false positives due to recent virus.    Her MyChart is not working at this time.    Call her when ready for .      Phone Number Patient can be reached at: Cell number on file:    Telephone Information:   Mobile [phone]   Mobile [phone]       Best Time: anytime    Can we leave a detailed message on this number? YES    Call taken on 1/28/2022 at 10:35 AM by Eric Casanova

## 2022-01-28 NOTE — TELEPHONE ENCOUNTER
I did write a letter for patient regarding her recent positive testing, but am unable to write a letter for her  since he has not been seen by me in the past.   He would need to set up an appointment to be seen to get this letter.

## 2022-01-28 NOTE — LETTER
01/28/22      To Whom it May Concern:    Marcelina Mcginnis tested positive for COVID on 1/4/2022 with a PCR test.   She has now fully recovered from the infection.   Since her positive test was within a month of her travel, she may still have a positive test for COVID.     Please do not hesitate to contact me if you have any questions or concerns.      Sincerely,      Nargis Hurley MD

## 2022-03-05 ENCOUNTER — HEALTH MAINTENANCE LETTER (OUTPATIENT)
Age: 47
End: 2022-03-05

## 2022-03-21 ENCOUNTER — OFFICE VISIT (OUTPATIENT)
Dept: FAMILY MEDICINE | Facility: CLINIC | Age: 47
End: 2022-03-21
Payer: COMMERCIAL

## 2022-03-21 VITALS
HEART RATE: 66 BPM | TEMPERATURE: 98.1 F | OXYGEN SATURATION: 98 % | BODY MASS INDEX: 24.73 KG/M2 | WEIGHT: 167 LBS | SYSTOLIC BLOOD PRESSURE: 120 MMHG | HEIGHT: 69 IN | DIASTOLIC BLOOD PRESSURE: 70 MMHG

## 2022-03-21 DIAGNOSIS — Z11.59 NEED FOR HEPATITIS C SCREENING TEST: ICD-10-CM

## 2022-03-21 DIAGNOSIS — R23.2 HOT FLASHES: ICD-10-CM

## 2022-03-21 DIAGNOSIS — Z11.4 SCREENING FOR HIV (HUMAN IMMUNODEFICIENCY VIRUS): ICD-10-CM

## 2022-03-21 DIAGNOSIS — Z12.31 ENCOUNTER FOR SCREENING MAMMOGRAM FOR BREAST CANCER: ICD-10-CM

## 2022-03-21 DIAGNOSIS — Z86.19 H/O COLD SORES: ICD-10-CM

## 2022-03-21 DIAGNOSIS — Z15.01 BRCA POSITIVE: ICD-10-CM

## 2022-03-21 DIAGNOSIS — Z15.09 BRCA POSITIVE: ICD-10-CM

## 2022-03-21 DIAGNOSIS — H90.5 CONGENITAL HEARING LOSS: ICD-10-CM

## 2022-03-21 DIAGNOSIS — Z12.11 SCREEN FOR COLON CANCER: ICD-10-CM

## 2022-03-21 DIAGNOSIS — Z00.00 ROUTINE GENERAL MEDICAL EXAMINATION AT A HEALTH CARE FACILITY: Primary | ICD-10-CM

## 2022-03-21 LAB
ALBUMIN UR-MCNC: NEGATIVE MG/DL
APPEARANCE UR: CLEAR
BACTERIA #/AREA URNS HPF: ABNORMAL /HPF
BILIRUB UR QL STRIP: NEGATIVE
CHOLEST SERPL-MCNC: 214 MG/DL
COLOR UR AUTO: YELLOW
FASTING STATUS PATIENT QL REPORTED: NO
GLUCOSE UR STRIP-MCNC: NEGATIVE MG/DL
HDLC SERPL-MCNC: 84 MG/DL
HGB BLD-MCNC: 15.9 G/DL (ref 11.7–15.7)
HGB UR QL STRIP: ABNORMAL
HIV 1+2 AB+HIV1 P24 AG SERPL QL IA: NEGATIVE
KETONES UR STRIP-MCNC: NEGATIVE MG/DL
LDLC SERPL CALC-MCNC: 98 MG/DL
LEUKOCYTE ESTERASE UR QL STRIP: NEGATIVE
NITRATE UR QL: NEGATIVE
PH UR STRIP: 5.5 [PH] (ref 5–8)
RBC #/AREA URNS AUTO: ABNORMAL /HPF
SP GR UR STRIP: 1.01 (ref 1–1.03)
SQUAMOUS #/AREA URNS AUTO: ABNORMAL /LPF
TRIGL SERPL-MCNC: 162 MG/DL
UROBILINOGEN UR STRIP-ACNC: 0.2 E.U./DL
WBC #/AREA URNS AUTO: ABNORMAL /HPF

## 2022-03-21 PROCEDURE — 87389 HIV-1 AG W/HIV-1&-2 AB AG IA: CPT | Performed by: FAMILY MEDICINE

## 2022-03-21 PROCEDURE — 85018 HEMOGLOBIN: CPT | Performed by: FAMILY MEDICINE

## 2022-03-21 PROCEDURE — 99396 PREV VISIT EST AGE 40-64: CPT | Performed by: FAMILY MEDICINE

## 2022-03-21 PROCEDURE — 36415 COLL VENOUS BLD VENIPUNCTURE: CPT | Performed by: FAMILY MEDICINE

## 2022-03-21 PROCEDURE — 81001 URINALYSIS AUTO W/SCOPE: CPT | Performed by: FAMILY MEDICINE

## 2022-03-21 PROCEDURE — G0123 SCREEN CERV/VAG THIN LAYER: HCPCS | Performed by: FAMILY MEDICINE

## 2022-03-21 PROCEDURE — 87624 HPV HI-RISK TYP POOLED RSLT: CPT | Performed by: FAMILY MEDICINE

## 2022-03-21 PROCEDURE — 80061 LIPID PANEL: CPT | Performed by: FAMILY MEDICINE

## 2022-03-21 PROCEDURE — 86803 HEPATITIS C AB TEST: CPT | Performed by: FAMILY MEDICINE

## 2022-03-21 PROCEDURE — 99214 OFFICE O/P EST MOD 30 MIN: CPT | Mod: 25 | Performed by: FAMILY MEDICINE

## 2022-03-21 RX ORDER — NORETHINDRONE ACETATE AND ETHINYL ESTRADIOL .02; 1 MG/1; MG/1
TABLET ORAL
Qty: 84 TABLET | Refills: 4 | Status: SHIPPED | OUTPATIENT
Start: 2022-03-21 | End: 2023-03-29

## 2022-03-21 RX ORDER — VALACYCLOVIR HYDROCHLORIDE 1 G/1
2000 TABLET, FILM COATED ORAL 2 TIMES DAILY
Qty: 4 TABLET | Refills: 3 | Status: SHIPPED | OUTPATIENT
Start: 2022-03-21 | End: 2022-03-22

## 2022-03-21 NOTE — PROGRESS NOTES
SUBJECTIVE:   CC: Marcelina Mcginnis is an 46 year old woman who presents for preventive health visit.       Patient has been advised of split billing requirements and indicates understanding: Yes  Healthy Habits:     Getting at least 3 servings of Calcium per day:  NO    Bi-annual eye exam:  Yes    Dental care twice a year:  Yes    Sleep apnea or symptoms of sleep apnea:  None    Diet:  Regular (no restrictions)    Frequency of exercise:  2-3 days/week    Duration of exercise:  30-45 minutes    Taking medications regularly:  Yes    Medication side effects:  None    PHQ-2 Total Score: 0    Additional concerns today:  Yes    Patient comes in today for physical exam.  She does have a history of being BRCA positive.  This has been known for quite some time.  She has had genetic counseling etc.  She has undergone an oophorectomy for this reason as well.  Today she is wondering if perhaps she could see a surgeon regarding breast removal.  She has previously thought about doing that but thought that she probably would wait until she is around 48 so is wondering about getting a referral to a breast surgeon for evaluation and discussion regarding that given again that she is BRCA positive.  She otherwise is doing well.  She does need a Pap smear today.  Her last one was on 5/1/2018 and was normal but that is been long enough now that we should probably repeat that.  She does continue on birth control pills.  She understands the risks that she is taking by being on birth control pills.  She uses them to help with hot flashes and mood swings since she has already had an oophorectomy.  She would very much like to continue on those for now.  She does desire HIV and hepatitis C testing which will be done today.  She has could COVID vaccination as well as COVID boosters.  We did discuss colon cancer screening and the fact that now they recommended at the age of 45.  I would recommend she call her insurance to find out about  coverage of it prior to us ordering it.  If she desires that in the future will let me know.  She is due for mammogram as well.  She is fairly certain that she has had hepatitis B vaccinations in the past.  She will get the records and will send them to us.  She does work as a histo technologist in a lab and so I would tend to agree that I think she has had them somewhere along the line.  She does have a history of congenital hearing loss and does wear bilateral hearing aids.  She has noted that she has had some ringing in her ears recently.  She does have a follow-up appointment with the audiologist for this reason as well.  She does use Valtrex for cold sores and she is wondering she get a refill for that as well.  It does seem like it works well as long she takes it right when she feels the symptoms.    Today's PHQ-2 Score:   PHQ-2 ( 1999 Pfizer) 3/21/2022   Q1: Little interest or pleasure in doing things 0   Q2: Feeling down, depressed or hopeless 0   PHQ-2 Score 0   Q1: Little interest or pleasure in doing things Not at all   Q2: Feeling down, depressed or hopeless Not at all   PHQ-2 Score 0       Abuse: Current or Past (Physical, Sexual or Emotional) - No  Do you feel safe in your environment? Yes    Have you ever done Advance Care Planning? (For example, a Health Directive, POLST, or a discussion with a medical provider or your loved ones about your wishes): No, advance care planning information given to patient to review.  Patient plans to discuss their wishes with loved ones or provider.      Social History     Tobacco Use     Smoking status: Former Smoker     Packs/day: 0.50     Years: 15.00     Pack years: 7.50     Smokeless tobacco: Never Used   Substance Use Topics     Alcohol use: Yes     Alcohol/week: 2.0 standard drinks     Types: 2 Standard drinks or equivalent per week     If you drink alcohol do you typically have >3 drinks per day or >7 drinks per week? No    Alcohol Use 3/21/2022   Prescreen: >3  drinks/day or >7 drinks/week? No   Prescreen: >3 drinks/day or >7 drinks/week? -       Reviewed orders with patient.  Reviewed health maintenance and updated orders accordingly - Yes      Breast Cancer Screening:    FHS-7:   Breast CA Risk Assessment (FHS-7) 3/21/2022   Did any of your first-degree relatives have breast or ovarian cancer? Yes   Did any of your relatives have bilateral breast cancer? Unknown   Did any man in your family have breast cancer? No   Did any woman in your family have breast and ovarian cancer? No   Did any woman in your family have breast cancer before age 50 y? No   Do you have 2 or more relatives with breast and/or ovarian cancer? Yes   Do you have 2 or more relatives with breast and/or bowel cancer? Yes     Patient has known BRCA positive status and has been to see the counselor in the past.  Mammogram Screening: Recommended annual mammography  Pertinent mammograms are reviewed under the imaging tab.    History of abnormal Pap smear: NO - age 30- 65 PAP every 3 years recommended  PAP / HPV Latest Ref Rng & Units 3/21/2022 5/1/2018   PAP - - Negative for squamous intraepithelial lesion or malignancy  Electronically signed by Yudy Schumacher CT (ASCP) on 5/10/2018 at 12:33 PM     HPV16 Negative Negative Negative   HPV18 Negative Negative Negative   HRHPV Negative Negative Negative     Reviewed and updated as needed this visit by clinical staff   Tobacco  Allergies  Meds   Med Hx  Surg Hx  Fam Hx  Soc Hx        Reviewed and updated as needed this visit by Provider   Tobacco  Allergies  Meds   Med Hx  Surg Hx  Fam Hx  Soc Hx         Current Outpatient Medications:      multivitamin with minerals (THERA-M) 9 mg iron-400 mcg Tab tablet, [MULTIVITAMIN WITH MINERALS (THERA-M) 9 MG IRON-400 MCG TAB TABLET] Take 1 tablet by mouth daily., Disp: , Rfl:      norethindrone-ethinyl estradiol (JUNEL 1/20) 1-20 MG-MCG tablet, TAKE 1 TABELT BY MOUTH DAILY. START A NEW PACK EVERY 3  WEEKS, Disp: 84 tablet, Rfl: 4     valACYclovir (VALTREX) 1000 mg tablet, Take 2 tablets (2,000 mg) by mouth 2 times daily for 1 day, Disp: 4 tablet, Rfl: 3     No Known Allergies     Past Medical History:   Diagnosis Date     BRCA positive     needs yearly mammo     Congenital hearing loss     wears bilateral hearing aids     H/O cold sores         Past Surgical History:   Procedure Laterality Date     BILATERAL OOPHORECTOMY      due to BRCA positive gene and family history of ovarian cancer        Family History   Problem Relation Age of Onset     Hereditary Breast and Ovarian Cancer Syndrome Mother         positive for gene     Heart Disease Father         MI age 42     Ovarian Cancer Maternal Grandmother 54.00     Heart Disease Paternal Grandfather         MI, age 42        Social History     Socioeconomic History     Marital status:      Spouse name: Michel     Number of children: 0     Years of education: Not on file     Highest education level: Not on file   Occupational History     Occupation: Histotech     Comment: [employer]   Tobacco Use     Smoking status: Former Smoker     Packs/day: 0.50     Years: 15.00     Pack years: 7.50     Smokeless tobacco: Never Used   Vaping Use     Vaping Use: Never used   Substance and Sexual Activity     Alcohol use: Yes     Alcohol/week: 2.0 standard drinks     Types: 2 Standard drinks or equivalent per week     Drug use: No     Sexual activity: Yes     Partners: Male     Birth control/protection: Surgical     Comment: has had ovaries removed   Other Topics Concern     Not on file   Social History Narrative     Not on file     Social Determinants of Health     Financial Resource Strain: Not on file   Food Insecurity: Not on file   Transportation Needs: Not on file   Physical Activity: Not on file   Stress: Not on file   Social Connections: Not on file   Intimate Partner Violence: Not on file   Housing Stability: Not on file        Immunization  "History   Administered Date(s) Administered     COVID-19,PF,Pfizer (12+ Yrs) 2020, 01/15/2021, 2021     Flu, Unspecified 2019     HepA-Adult 2008, 2010     Influenza (IIV3) PF 10/15/2016     Influenza Vaccine IM > 6 months Valent IIV4 (Alfuria,Fluzone) 2014, 10/21/2015, 2015, 2016, 2017, 2021     Td (Adult), Adsorbed 2001     Tdap (Adacel,Boostrix) 2008, 2018        OB History    Para Term  AB Living   0 0 0 0 0 0   SAB IAB Ectopic Multiple Live Births   0 0 0 0 0          Review of Systems  See below     OBJECTIVE:   /70   Pulse 66   Temp 98.1  F (36.7  C)   Ht 1.746 m (5' 8.75\")   Wt 75.8 kg (167 lb)   SpO2 98%   Breastfeeding No   BMI 24.84 kg/m    Physical Exam  REVIEW OF SYSTEMS:   Denies fever, chills, visual changes, fatigue, myalgias, nasal congestion, rhinorrhea, ear pain or discharge, sore throat, swollen glands, breast mass, nipple discharge, breast changes, abdominal pain, nausea, vomiting, diarrhea, constipation, cough, shortness of breath, chest pain, weight change, change in bowel habits, melena, rectal bleeding, dysuria, frequency, urgency, hematuria, polyuria, polydipsia, polyphagia, joint pain or swelling or erythema, edema, rash, weakness, paresthesias, vaginal discharge or bleeding or mood changes other than that mentioned above in HPI.   Remainder of review of systems was negative.      PHYSICAL EXAM:  On exam, patient is a WD, WN 46 year old female in NAD.  /70   Pulse 66   Temp 98.1  F (36.7  C)   Ht 1.746 m (5' 8.75\")   Wt 75.8 kg (167 lb)   SpO2 98%   Breastfeeding No   BMI 24.84 kg/m    Head normocephalic, atraumatic.  Eyes PERRL, ears TM s clear bilaterally.  Throat without significant erythemia or exudate.  Neck was supple, full range of motion. No significant lymphadenopathy or thyromegaly was appreciated.  Lungs clear to auscultation  Heart regular rate and " rhythm.  Breast exam was done. No masses were appreciated. Axilla were clear bilaterally. No nipple discharge was appreciated. Self breast exam was reviewed and taught today.  Abdomen was soft, nontender, nondistended. No masses or organomegaly were palpated. Positive bowel sounds were appreciated.  Extremities with full range of motion of all 4 extremities were noted.  Deep tendon reflexes were equal and symmetrical. Motor and sensation were intact to both the upper and lower extremities.  Cranial nerves 2 through 12 were grossly intact.  EOM were intact.  Pelvic exam was done.  External genitalia appeared normal. Speculum was introduced and the Pap smear obtained. Bimanual exam revealed uterus to be normal size, pelvis without palpable masses.   A&O x3, thought processes congruent, non-tangential. No hallucinations/delusions. Insight/judgment: intact. Denies suicidal/homicidal ideations.      LABS:    Recent Results (from the past 240 hour(s))   HPV High Risk Types DNA Cervical    Collection Time: 03/21/22  2:00 PM   Result Value Ref Range    Other HR HPV Negative Negative    HPV16 DNA Negative Negative    HPV18 DNA Negative Negative    FINAL DIAGNOSIS       This patient's sample is negative for HPV DNA.        This test was developed and its performance characteristics determined by the [facility]. It has not been cleared or approved by the FDA. The laboratory is regulated under CLIA as qualified to perform high-complexity testing. This test is used for clinical purposes. It should not be regarded as investigational or for research.    METHODOLOGY: The Roche Archie 4800 system uses automated extraction, simultaneous amplification of HPV (L1 region) and beta-globin, followed by real time detection of fluorescent labeled HPV and beta globin using specific oligonucleotide probes. The test specifically identified types HPV 16 DNA and HPV 18 DNA while  concurrently detecting the rest of the high risk types (31, 33, 35, 39, 45, 51, 52, 56, 58, 59, 66 or 68).    COMMENTS: This test is not intended for use as a screening device for woman under age 30 with normal cervical cytology. Results should be correlated with cytologic and histologic findings. Close clinical followup is recommended.       Hemoglobin    Collection Time: 03/21/22  2:56 PM   Result Value Ref Range    Hemoglobin 15.9 (H) 11.7 - 15.7 g/dL   Lipid panel reflex to direct LDL Fasting    Collection Time: 03/21/22  2:56 PM   Result Value Ref Range    Cholesterol 214 (H) <=199 mg/dL    Triglycerides 162 (H) <=149 mg/dL    Direct Measure HDL 84 >=50 mg/dL    LDL Cholesterol Calculated 98 <=129 mg/dL    Patient Fasting > 8hrs? No    HIV Antigen Antibody Combo    Collection Time: 03/21/22  2:56 PM   Result Value Ref Range    HIV Antigen Antibody Combo Negative Negative   Hepatitis C Screen Reflex to HCV RNA Quant and Genotype    Collection Time: 03/21/22  2:56 PM   Result Value Ref Range    Hepatitis C Antibody Nonreactive Nonreactive   UA reflex to Microscopic and Culture    Collection Time: 03/21/22  3:01 PM    Specimen: Urine, Midstream   Result Value Ref Range    Color Urine Yellow Colorless, Straw, Light Yellow, Yellow    Appearance Urine Clear Clear    Glucose Urine Negative Negative mg/dL    Bilirubin Urine Negative Negative    Ketones Urine Negative Negative mg/dL    Specific Gravity Urine 1.010 1.005 - 1.030    Blood Urine Small (A) Negative    pH Urine 5.5 5.0 - 8.0    Protein Albumin Urine Negative Negative mg/dL    Urobilinogen Urine 0.2 0.2, 1.0 E.U./dL    Nitrite Urine Negative Negative    Leukocyte Esterase Urine Negative Negative   Urine Microscopic    Collection Time: 03/21/22  3:01 PM   Result Value Ref Range    Bacteria Urine Few (A) None Seen /HPF    RBC Urine 0-2 0-2 /HPF /HPF    WBC Urine None Seen 0-5 /HPF /HPF    Squamous Epithelials Urine Moderate (A) None Seen /LPF          ASSESSMENT/PLAN:   ASSESSMENT: 46 year old female physical exam and pap smear.    PLAN:     Routine general medical examination at a health care facility [Z00.00]    1. Routine general medical examination at a health care facility  - Hemoglobin; Future  - Lipid panel reflex to direct LDL Fasting; Future  - UA reflex to Microscopic and Culture; Future  - Gynecologic Cytology (PAP)  - Hemoglobin  - Lipid panel reflex to direct LDL Fasting  - UA reflex to Microscopic and Culture  - Urine Microscopic  - HPV High Risk Types DNA Cervical    2. BRCA positive  - *MA Screening Digital Bilateral; Future  - Adult General Surg Referral; Future    3. Congenital hearing loss    4. Hot flashes  - norethindrone-ethinyl estradiol (JUNEL 1/20) 1-20 MG-MCG tablet; TAKE 1 TABELT BY MOUTH DAILY. START A NEW PACK EVERY 3 WEEKS  Dispense: 84 tablet; Refill: 4    5. H/O cold sores  - valACYclovir (VALTREX) 1000 mg tablet; Take 2 tablets (2,000 mg) by mouth 2 times daily for 1 day  Dispense: 4 tablet; Refill: 3    6. Encounter for screening mammogram for breast cancer  - *MA Screening Digital Bilateral; Future    7. Screen for colon cancer    8. Screening for HIV (human immunodeficiency virus)  - HIV Antigen Antibody Combo; Future  - HIV Antigen Antibody Combo    9. Need for hepatitis C screening test  - Hepatitis C Screen Reflex to HCV RNA Quant and Genotype; Future  - Hepatitis C Screen Reflex to HCV RNA Quant and Genotype      Patient is a 46 year old female who is overall doing well.  She is feeling well.  She does have a history of being BRCA positive.  She has had genetic counseling as a result of that and has had a previous oophorectomy.  She is interested in getting her breast removed for that reason as well.  Will refer her to Dr. Eaton for further evaluation.  I did place that referral and someone from surgery should contact her regarding getting that scheduled.  Certainly with her being BRCA positive it is very reasonable  for her to consider breast removal and we did discuss that at length today.  She also has a history of congenital hearing loss.  She does wear hearing aids.  She has noticed recently that she is had more ringing in her ears.  She does have an evaluation with the audiologist in the near future and is going to get new hearing aids.  She is hoping that that will help to some extent.  She continues on birth control pills for hot flashes and mood swings.  These seem to be doing well.  She takes a active pill every day.  She is tolerating that well we will go ahead and refill that today for a year supply.  She also is asking for refill of her Valtrex.  She uses that for the occasional cold sore that she gets.  Refill of Valtrex was given today.  She is overdue for mammogram so we will go ahead and order that today.  We did discuss colon cancer screening today.  She is age 46.  The new recommendation is age 45 for colonoscopy.  We discussed this at length today.  She would like to wait until she is age 48 prior to proceeding with that.  HIV and hepatitis C testing was done today.  We did talk about hepatitis B shots.  She thinks that she has had them in the past.  She will try to get records for us.  She has already completed her COVID series of immunizations.  All of her questions and concerns were addressed today.  If she has additional problems or concerns should let me know.    Will contact her with the results of the labs when available.    Voice recognition software was used in the creation of this note. Any grammatical or nonsensical errors are due to inherent errors with the software and are not the intention of the writer.      Nargis Hurley MD       Patient has been advised of split billing requirements and indicates understanding: Yes    COUNSELING:  Reviewed preventive health counseling, as reflected in patient instructions       Regular exercise       Healthy diet/nutrition       Colorectal Cancer  "Screening       Consider Hep C screening for all patients one time for ages 18-79 years       HIV screeninx in teen years, 1x in adult years, and at intervals if high risk    Estimated body mass index is 24.84 kg/m  as calculated from the following:    Height as of this encounter: 1.746 m (5' 8.75\").    Weight as of this encounter: 75.8 kg (167 lb).        She reports that she has quit smoking. She has a 7.50 pack-year smoking history. She has never used smokeless tobacco.      Counseling Resources:  ATP IV Guidelines  Pooled Cohorts Equation Calculator  Breast Cancer Risk Calculator  BRCA-Related Cancer Risk Assessment: FHS-7 Tool  FRAX Risk Assessment  ICSI Preventive Guidelines  Dietary Guidelines for Americans, 2010  USDA's MyPlate  ASA Prophylaxis  Lung CA Screening    Nargis Hurley MD  [facility]  "

## 2022-03-22 LAB — HCV AB SERPL QL IA: NONREACTIVE

## 2022-03-23 LAB
HUMAN PAPILLOMA VIRUS 16 DNA: NEGATIVE
HUMAN PAPILLOMA VIRUS 18 DNA: NEGATIVE
HUMAN PAPILLOMA VIRUS FINAL DIAGNOSIS: NORMAL
HUMAN PAPILLOMA VIRUS OTHER HR: NEGATIVE

## 2022-03-24 ENCOUNTER — TELEPHONE (OUTPATIENT)
Dept: FAMILY MEDICINE | Facility: CLINIC | Age: 47
End: 2022-03-24

## 2022-03-24 NOTE — TELEPHONE ENCOUNTER
Reason for Call:  Other referral to audiology and testing    Detailed comments: pt would like order for audiology consult and hearing testing. She would like this sent to   [facility] in [facility], fax [phone]    Advised pt this is not in our network and to check with insurance as we would not be able to do a referral for insurance purposes(does not appear to have a referral based plan). She stated she understood and would check with insurance    Phone Number Patient can be reached at: Home number on file [phone] (home)    Best Time: amytime    Can we leave a detailed message on this number? YES    Call taken on 3/24/2022 at 3:18 PM by Eric Casanova

## 2022-03-29 LAB
BKR LAB AP GYN ADEQUACY: NORMAL
BKR LAB AP GYN INTERPRETATION: NORMAL
BKR LAB AP HPV REFLEX: NORMAL
BKR LAB AP PREVIOUS ABNORMAL: NORMAL
PATH REPORT.COMMENTS IMP SPEC: NORMAL
PATH REPORT.COMMENTS IMP SPEC: NORMAL
PATH REPORT.RELEVANT HX SPEC: NORMAL

## 2022-04-05 ENCOUNTER — ANCILLARY PROCEDURE (OUTPATIENT)
Dept: MAMMOGRAPHY | Facility: CLINIC | Age: 47
End: 2022-04-05
Attending: FAMILY MEDICINE
Payer: COMMERCIAL

## 2022-04-05 DIAGNOSIS — Z12.31 ENCOUNTER FOR SCREENING MAMMOGRAM FOR BREAST CANCER: ICD-10-CM

## 2022-04-05 DIAGNOSIS — Z15.09 BRCA POSITIVE: ICD-10-CM

## 2022-04-05 DIAGNOSIS — Z15.01 BRCA POSITIVE: ICD-10-CM

## 2022-04-05 PROCEDURE — 77067 SCR MAMMO BI INCL CAD: CPT

## 2022-04-13 ENCOUNTER — TELEPHONE (OUTPATIENT)
Dept: NURSING | Facility: CLINIC | Age: 47
End: 2022-04-13
Payer: COMMERCIAL

## 2022-04-13 DIAGNOSIS — H90.5 CONGENITAL HEARING LOSS: Primary | ICD-10-CM

## 2022-04-13 NOTE — TELEPHONE ENCOUNTER
Representative from [facility] is calling and looking for a referral for a hearing test for patient, as notes say an order/referral was placed, but they have not seen it yet.    Please call them with information/answers.    Anastasiia Loaiza RN on 4/13/2022 at 4:35 PM

## 2022-04-26 ENCOUNTER — OFFICE VISIT (OUTPATIENT)
Dept: SURGERY | Facility: CLINIC | Age: 47
End: 2022-04-26
Attending: FAMILY MEDICINE
Payer: COMMERCIAL

## 2022-04-26 VITALS — BODY MASS INDEX: 24.69 KG/M2 | WEIGHT: 166 LBS

## 2022-04-26 DIAGNOSIS — Z15.09 BRCA POSITIVE: ICD-10-CM

## 2022-04-26 DIAGNOSIS — Z15.01 BRCA POSITIVE: ICD-10-CM

## 2022-04-26 PROCEDURE — 99203 OFFICE O/P NEW LOW 30 MIN: CPT | Performed by: SPECIALIST

## 2022-04-26 ASSESSMENT — PAIN SCALES - GENERAL: PAINLEVEL: NO PAIN (0)

## 2022-04-26 NOTE — PROGRESS NOTES
History of Present Illness:  This is a 47 year old y/o female who I am asked to see by Nargis Hurley for evaluation and discussed the options for BRCA mutation.  The patient has known for about 7 years that she has a BRCA1 carrier.  Her grandmother  of ovarian cancer.  On her mom side of the family there is also colon cancer and breast cancer.  Her mom however has had no issues.  Marcelina has already had bilateral oophorectomies done.    PAST MEDICAL HISTORY:  Past Medical History:   Diagnosis Date     BRCA positive     needs yearly mammo     Congenital hearing loss     wears bilateral hearing aids     H/O cold sores        Past Surgical History:   Procedure Laterality Date     BILATERAL OOPHORECTOMY      due to BRCA positive gene and family history of ovarian cancer         Medications:    Current Outpatient Medications:      multivitamin with minerals (THERA-M) 9 mg iron-400 mcg Tab tablet, [MULTIVITAMIN WITH MINERALS (THERA-M) 9 MG IRON-400 MCG TAB TABLET] Take 1 tablet by mouth daily., Disp: , Rfl:      norethindrone-ethinyl estradiol (JUNEL ) 1-20 MG-MCG tablet, TAKE 1 TABELT BY MOUTH DAILY. START A NEW PACK EVERY 3 WEEKS, Disp: 84 tablet, Rfl: 4     valACYclovir (VALTREX) 1000 mg tablet, Take 2 tablets (2,000 mg) by mouth 2 times daily for 1 day, Disp: 4 tablet, Rfl: 3      Allergies:   No Known Allergies     Social History:  Social History     Tobacco Use     Smoking status: Former Smoker     Packs/day: 0.50     Years: 15.00     Pack years: 7.50     Smokeless tobacco: Never Used   Vaping Use     Vaping Use: Never used   Substance Use Topics     Alcohol use: Yes     Alcohol/week: 2.0 standard drinks     Types: 2 Standard drinks or equivalent per week     Drug use: No        ROS:  Pertinent items are noted in HPI.    PHYSICAL EXAM:  Wt 75.3 kg (166 lb)   BMI 24.69 kg/m    General: alert, appears stated age and cooperative  Breast: No palpable masses in either breast.  Axilla: No axillary  adenopathy      Impression: BRCA 1 carrier.  The patient has already visited with a genetic counselor in the past and understands her risk of developing breast cancer.  Explained that doing bilateral mastectomies does not eliminate the risk but decreases it is much as anything can.  She wants to wait until she turns 48.  I told her that is totally her choice.  There is no way to know if or when she is going to develop breast cancer.  I talked about what the procedure looks like with or without reconstruction.  She asked appropriate questions.  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  We talked about typical recovery.  Again, she wants to wait until next year so I recommend she continue with screening mammograms up until that point.    Plan: She wants to wait until next year.  I told her that it is never too early to talk to a plastic surgeon so she should try to make an appointment with 1 at least 3 months prior to when she wants to do the surgery.  I have given her the names of the plastic surgeons that I work with.  I would likely also want to talk with her 1 more time prior to scheduling any surgery.  She is just to contact me at that point.

## 2022-04-26 NOTE — LETTER
2022         RE: Marcelina Mcginnis  [address] [place] [address]        Dear Colleague,    Thank you for referring your patient, Marcelina Mcginnis, to the [facility] [place]. Please see a copy of my visit note below.    History of Present Illness:  This is a 47 year old y/o female who I am asked to see by Nargis Hurley for evaluation and discussed the options for BRCA mutation.  The patient has known for about 7 years that she has a BRCA1 carrier.  Her grandmother  of ovarian cancer.  On her mom side of the family there is also colon cancer and breast cancer.  Her mom however has had no issues.  Marcelina has already had bilateral oophorectomies done.    PAST MEDICAL HISTORY:  Past Medical History:   Diagnosis Date     BRCA positive     needs yearly mammo     Congenital hearing loss     wears bilateral hearing aids     H/O cold sores        Past Surgical History:   Procedure Laterality Date     BILATERAL OOPHORECTOMY      due to BRCA positive gene and family history of ovarian cancer         Medications:    Current Outpatient Medications:      multivitamin with minerals (THERA-M) 9 mg iron-400 mcg Tab tablet, [MULTIVITAMIN WITH MINERALS (THERA-M) 9 MG IRON-400 MCG TAB TABLET] Take 1 tablet by mouth daily., Disp: , Rfl:      norethindrone-ethinyl estradiol (JUNEL ) 1-20 MG-MCG tablet, TAKE 1 TABELT BY MOUTH DAILY. START A NEW PACK EVERY 3 WEEKS, Disp: 84 tablet, Rfl: 4     valACYclovir (VALTREX) 1000 mg tablet, Take 2 tablets (2,000 mg) by mouth 2 times daily for 1 day, Disp: 4 tablet, Rfl: 3      Allergies:   No Known Allergies     Social History:  Social History     Tobacco Use     Smoking status: Former Smoker     Packs/day: 0.50     Years: 15.00     Pack years: 7.50     Smokeless tobacco: Never Used   Vaping Use     Vaping Use: Never used   Substance Use Topics     Alcohol use: Yes     Alcohol/week: 2.0 standard drinks     Types: 2 Standard  drinks or equivalent per week     Drug use: No        ROS:  Pertinent items are noted in HPI.    PHYSICAL EXAM:  Wt 75.3 kg (166 lb)   BMI 24.69 kg/m    General: alert, appears stated age and cooperative  Breast: No palpable masses in either breast.  Axilla: No axillary adenopathy      Impression: BRCA 1 carrier.  The patient has already visited with a genetic counselor in the past and understands her risk of developing breast cancer.  Explained that doing bilateral mastectomies does not eliminate the risk but decreases it is much as anything can.  She wants to wait until she turns 48.  I told her that is totally her choice.  There is no way to know if or when she is going to develop breast cancer.  I talked about what the procedure looks like with or without reconstruction.  She asked appropriate questions.  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  We talked about typical recovery.  Again, she wants to wait until next year so I recommend she continue with screening mammograms up until that point.    Plan: She wants to wait until next year.  I told her that it is never too early to talk to a plastic surgeon so she should try to make an appointment with 1 at least 3 months prior to when she wants to do the surgery.  I have given her the names of the plastic surgeons that I work with.  I would likely also want to talk with her 1 more time prior to scheduling any surgery.  She is just to contact me at that point.            Again, thank you for allowing me to participate in the care of your patient.        Sincerely,        Cass Eaton MD

## 2022-04-26 NOTE — NURSING NOTE
Marcelina is here for discussion of a recent BRCA result. She had a grandmother who is  from ovarian cancer. Her mother had a hysterectomy but nothing for breast cancer. Marcelina has had a hysterectomy 5 years ago. 8 min spent in nursing time. KATIE Oneal, OCN, CBCN

## 2022-05-03 ENCOUNTER — TELEPHONE (OUTPATIENT)
Dept: SURGERY | Facility: CLINIC | Age: 47
End: 2022-05-03
Payer: COMMERCIAL

## 2022-05-03 NOTE — TELEPHONE ENCOUNTER
Spoke with Marcelina today to get her scheduled for a consult with Dr. Avery following her recent consult with Dr. Eaton. Pt is looking to have mastectomy with reconstruction after October 20th. We are thinking 11/14/2022 pt and I will be in contact after her consult date with Dr. Avery on 5/11.

## 2022-05-11 ENCOUNTER — OFFICE VISIT (OUTPATIENT)
Dept: PLASTIC SURGERY | Facility: AMBULATORY SURGERY CENTER | Age: 47
End: 2022-05-11
Payer: COMMERCIAL

## 2022-05-11 VITALS — BODY MASS INDEX: 25.16 KG/M2 | WEIGHT: 166 LBS | HEIGHT: 68 IN

## 2022-05-11 DIAGNOSIS — Z15.01 BRCA GENE MUTATION POSITIVE IN FEMALE: Primary | ICD-10-CM

## 2022-05-11 DIAGNOSIS — Z15.09 BRCA GENE MUTATION POSITIVE IN FEMALE: Primary | ICD-10-CM

## 2022-05-11 DIAGNOSIS — Z15.02 BRCA GENE MUTATION POSITIVE IN FEMALE: Primary | ICD-10-CM

## 2022-05-11 PROCEDURE — 99204 OFFICE O/P NEW MOD 45 MIN: CPT | Performed by: PLASTIC SURGERY

## 2022-05-11 NOTE — Clinical Note
Hi ladies,  Chio, this patient will have surgery with Dr. Eaton and I sometime in November and I wanted to placed my case request so you will have it on the grid.   Thanks,  HM

## 2022-05-11 NOTE — LETTER
5/11/2022         RE: Marcelina Mcginnis  [address] [facility] [address]        Dear Colleague,    Thank you for referring your patient, Marcelina Mcginnis, to the [facility]. Please see a copy of my visit note below.    Chief complaint:  Positive BRCA 1 mutation.     History of present illness:  This is a 47 year old female who I'm asked to see by Dr. Eaton  to discuss breast reconstruction options.  Ms. Mcginnis is a known BRCA1 carrier for the last 7 years.  She already underwent bilateral oophorectomies.  Now, patient is ready for bilateral prophylactic mastectomies and she has been referred to me to discuss reconstructive options.     Past medical history:  Past Medical History:   Diagnosis Date     BRCA positive     needs yearly mammo     Congenital hearing loss     wears bilateral hearing aids     H/O cold sores         Past surgical history:  Bilateral oophorectomies, tonsillectomy     Allergies:  No known drug allergies     Medications:    Current Outpatient Medications:      norethindrone-ethinyl estradiol (JUNEL 1/20) 1-20 MG-MCG tablet, TAKE 1 TABELT BY MOUTH DAILY. START A NEW PACK EVERY 3 WEEKS, Disp: 84 tablet, Rfl: 4     valACYclovir (VALTREX) 1000 mg tablet, Take 2 tablets (2,000 mg) by mouth 2 times daily for 1 day, Disp: 4 tablet, Rfl: 3     Family history:  Grandmother with history of ovarian cancer     GYN history:  G0, P0     Social History:  Denies tobacco, drinks alcohol occasionally     Review of systems:  General ROS: No complaints or constitutional symptoms  Skin: No complaints or symptoms   Hematologic/Lymphatic: No symptoms or complaints  Psychiatric: No symptoms or complaints  Endocrine: No excessive fatigue, no hypermetabolic symptoms reported  Respiratory ROS: No cough, shortness of breath, or wheezing  Cardiovascular ROS: No chest pain or dyspnea on exertion  Breast ROS: Denies nipple discharge, denies nipple inversion, denies  "palpable breast masses, denies peau d'orange.  Gastrointestinal ROS: No abdominal pain, nausea, diarrhea, or constipation  Musculoskeletal ROS: No recent injuries reported  Neurological ROS: No symptoms or complaints     Physical exam:  Ht 1.727 m (5' 8\")   Wt 75.3 kg (166 lb)   BMI 25.24 kg/m    General: Alert, cooperative, appears stated age   Skin: Skin color, texture, turgor normal, no rashes or lesions   Lymphatic: No obvious adenopathy, no swelling   Eyes: No scleral icterus, pupils equal  HENT: No traumatic injury to the head or face, no gross abnormalities  Lungs: Normal respiratory effort, breath sounds equal bilaterally  Heart: Regular rate and rhythm  Breasts: Bilateral grade 1 ptosis, left breast is slightly larger than the right breast.  No nipple inversion, no peau d'orange.  No palpable breast masses or nipple discharge.  On the right breast sternal notch to nipple distance 24 cm, nipple to inframammary fold distance is 7 cm, breast diameter 14 cm.  On the left breast sternal notch to nipple distance 23 cm, nipple to inframammary fold is 8 cm and breast diameter is 15 cm. There were no palpable axillary lymphadenopathies bilaterally.  Abdomen: Soft, non-distended and non-tender to palpation  Neurologic: Grossly intact                                     Assessment:  This is a 47 year old patient with BRCA1 gene mutation.     She is scheduled with Dr. Eaton for bilateral prophylactic mastectomies in November 2022.     PLAN:   This patient is an excellent candidate for immediate bilateral breast reconstruction.     I discussed with her options for autologous versus implant-based reconstruction. Patient had decided for implant-based reconstruction.     For this bilateral implant-based breast reconstruction I have offered her immediate prepectoral direct to implant breast reconstruction.  I have also offered her AlloDerm coverage for both implants in order to increase the thickness of the tissue that " will be covering the implants.  AlloDerm also diminish the incidence of capsular contracture. Furthermore, I will interrogate the blood supply for the mastectomy flaps with the Spy machine.  This case will done at [facility].     Based upon her chest frame, breast dimensions and the fact that she is desiring nipple sparing mastectomies I will utilize Allergan  high profile, cohesive, permanent breast implants with volume of 695 cc, 650 cc, 605, 560 and 520 cc.  I will also utilize intraoperative sizers for better determination of her final volume.  In the unlikely scenario that the blood supply to the mastectomy flaps is diminished, I will utilize 600 cc tissue expanders bilaterally.  If tissue expanders are utilized, patient will need multiple visits in order to fill the volume of the implant and between 3 to 6 months later, second stage reconstruction with permanent implants would take place.     I discussed with the patient that potential incisions for the mastectomy could be elliptical-periareolar versus at the level of the inframammary fold.  However, the final incision will be determined in conjunction with Dr. Eaton.     I also have a lengthy discussion about the risks of the surgery and they include but are not limited to scarring, infection, potential loss of the implant requiring explantation, hematoma, seroma, breast asymmetry, nipple asymmetry, inframammary fold asymmetry, permanent loss of the sensation of the nipple areolar complex and breast skin, tattooing of nipple areolar complex if patient chooses skin sparing mastectomy or there is necrosis of the nipple areolar complex, need for potential future revisional surgeries, possible fat grafting, relationship and intimacy problems leading to breakup and or separation/divorce. No guarantees that her reconstructive breasts will be of the exact size or shape that she has right now were given to the patient. I have stressed to the patient that  this is reconstructive breast surgery after breast cancer or to prevent breast cancer not plain cosmetic breast surgery.     Patient has acknowledged all these risks and has agreed to proceed.     We will make arrangement with Dr. Eaton.    I will see the patient again in October to review again the reconstructive surgical planning.     Time spent with the patient 45 minutes.      Raymon Avery MD , FACS   Diplomate American Board of Plastic Surgery  Diplomate American Board of Surgery  Adj. Assistant Professor of Surgery  Division of Plastic & Reconstructive Surgery   [facility]  Office: [phone]   5/12/2022 at 10:30 AM         Again, thank you for allowing me to participate in the care of your patient.        Sincerely,        Raymon Avery MD

## 2022-05-11 NOTE — PROGRESS NOTES
"Chief complaint:  Positive BRCA 1 mutation.     History of present illness:  This is a 47 year old female who I'm asked to see by Dr. Eaton  to discuss breast reconstruction options.  Ms. Mcginnis is a known BRCA1 carrier for the last 7 years.  She already underwent bilateral oophorectomies.  Now, patient is ready for bilateral prophylactic mastectomies and she has been referred to me to discuss reconstructive options.     Past medical history:  Past Medical History:   Diagnosis Date     BRCA positive     needs yearly mammo     Congenital hearing loss     wears bilateral hearing aids     H/O cold sores         Past surgical history:  Bilateral oophorectomies, tonsillectomy     Allergies:  No known drug allergies     Medications:    Current Outpatient Medications:      norethindrone-ethinyl estradiol (JUNEL 1/20) 1-20 MG-MCG tablet, TAKE 1 TABELT BY MOUTH DAILY. START A NEW PACK EVERY 3 WEEKS, Disp: 84 tablet, Rfl: 4     valACYclovir (VALTREX) 1000 mg tablet, Take 2 tablets (2,000 mg) by mouth 2 times daily for 1 day, Disp: 4 tablet, Rfl: 3     Family history:  Grandmother with history of ovarian cancer     GYN history:  G0, P0     Social History:  Denies tobacco, drinks alcohol occasionally     Review of systems:  General ROS: No complaints or constitutional symptoms  Skin: No complaints or symptoms   Hematologic/Lymphatic: No symptoms or complaints  Psychiatric: No symptoms or complaints  Endocrine: No excessive fatigue, no hypermetabolic symptoms reported  Respiratory ROS: No cough, shortness of breath, or wheezing  Cardiovascular ROS: No chest pain or dyspnea on exertion  Breast ROS: Denies nipple discharge, denies nipple inversion, denies palpable breast masses, denies peau d'orange.  Gastrointestinal ROS: No abdominal pain, nausea, diarrhea, or constipation  Musculoskeletal ROS: No recent injuries reported  Neurological ROS: No symptoms or complaints     Physical exam:  Ht 1.727 m (5' 8\")   Wt 75.3 kg (166 " lb)   BMI 25.24 kg/m    General: Alert, cooperative, appears stated age   Skin: Skin color, texture, turgor normal, no rashes or lesions   Lymphatic: No obvious adenopathy, no swelling   Eyes: No scleral icterus, pupils equal  HENT: No traumatic injury to the head or face, no gross abnormalities  Lungs: Normal respiratory effort, breath sounds equal bilaterally  Heart: Regular rate and rhythm  Breasts: Bilateral grade 1 ptosis, left breast is slightly larger than the right breast.  No nipple inversion, no peau d'orange.  No palpable breast masses or nipple discharge.  On the right breast sternal notch to nipple distance 24 cm, nipple to inframammary fold distance is 7 cm, breast diameter 14 cm.  On the left breast sternal notch to nipple distance 23 cm, nipple to inframammary fold is 8 cm and breast diameter is 15 cm. There were no palpable axillary lymphadenopathies bilaterally.  Abdomen: Soft, non-distended and non-tender to palpation  Neurologic: Grossly intact                                     Assessment:  This is a 47 year old patient with BRCA1 gene mutation.     She is scheduled with Dr. Eaton for bilateral prophylactic mastectomies in November 2022.     PLAN:   This patient is an excellent candidate for immediate bilateral breast reconstruction.     I discussed with her options for autologous versus implant-based reconstruction. Patient had decided for implant-based reconstruction.     For this bilateral implant-based breast reconstruction I have offered her immediate prepectoral direct to implant breast reconstruction.  I have also offered her AlloDerm coverage for both implants in order to increase the thickness of the tissue that will be covering the implants.  AlloDerm also diminish the incidence of capsular contracture. Furthermore, I will interrogate the blood supply for the mastectomy flaps with the Spy machine.  This case will done at [facility].     Based upon her chest frame, breast  dimensions and the fact that she is desiring nipple sparing mastectomies I will utilize Allergan  high profile, cohesive, permanent breast implants with volume of 695 cc, 650 cc, 605, 560 and 520 cc.  I will also utilize intraoperative sizers for better determination of her final volume.  In the unlikely scenario that the blood supply to the mastectomy flaps is diminished, I will utilize 600 cc tissue expanders bilaterally.  If tissue expanders are utilized, patient will need multiple visits in order to fill the volume of the implant and between 3 to 6 months later, second stage reconstruction with permanent implants would take place.     I discussed with the patient that potential incisions for the mastectomy could be elliptical-periareolar versus at the level of the inframammary fold.  However, the final incision will be determined in conjunction with Dr. Eaton.     I also have a lengthy discussion about the risks of the surgery and they include but are not limited to scarring, infection, potential loss of the implant requiring explantation, hematoma, seroma, breast asymmetry, nipple asymmetry, inframammary fold asymmetry, permanent loss of the sensation of the nipple areolar complex and breast skin, tattooing of nipple areolar complex if patient chooses skin sparing mastectomy or there is necrosis of the nipple areolar complex, need for potential future revisional surgeries, possible fat grafting, relationship and intimacy problems leading to breakup and or separation/divorce. No guarantees that her reconstructive breasts will be of the exact size or shape that she has right now were given to the patient. I have stressed to the patient that this is reconstructive breast surgery after breast cancer or to prevent breast cancer not plain cosmetic breast surgery.     Patient has acknowledged all these risks and has agreed to proceed.     We will make arrangement with Dr. Eaton.    I will see the patient again in  October to review again the reconstructive surgical planning.     Time spent with the patient 45 minutes.      Raymon Avery MD , FACS   Diplomate American Board of Plastic Surgery  Diplomate American Board of Surgery  Adj. Assistant Professor of Surgery  Division of Plastic & Reconstructive Surgery   [facility]  Office: [phone]   5/12/2022 at 10:30 AM

## 2022-08-03 DIAGNOSIS — Z15.09 BRCA POSITIVE: Primary | ICD-10-CM

## 2022-08-03 DIAGNOSIS — Z15.01 BRCA POSITIVE: Primary | ICD-10-CM

## 2022-08-03 RX ORDER — CEFAZOLIN SODIUM/WATER 2 G/20 ML
2 SYRINGE (ML) INTRAVENOUS SEE ADMIN INSTRUCTIONS
Status: CANCELLED | OUTPATIENT
Start: 2022-11-14

## 2022-08-03 RX ORDER — CEFAZOLIN SODIUM/WATER 2 G/20 ML
2 SYRINGE (ML) INTRAVENOUS
Status: CANCELLED | OUTPATIENT
Start: 2022-11-14

## 2022-10-06 ENCOUNTER — OFFICE VISIT (OUTPATIENT)
Dept: PLASTIC SURGERY | Facility: AMBULATORY SURGERY CENTER | Age: 47
End: 2022-10-06
Payer: COMMERCIAL

## 2022-10-06 DIAGNOSIS — Z15.09 BRCA1 GENETIC CARRIER: Primary | ICD-10-CM

## 2022-10-06 DIAGNOSIS — Z98.890 S/P BREAST RECONSTRUCTION, BILATERAL: ICD-10-CM

## 2022-10-06 DIAGNOSIS — Z15.01 BRCA1 GENETIC CARRIER: Primary | ICD-10-CM

## 2022-10-06 PROCEDURE — 99214 OFFICE O/P EST MOD 30 MIN: CPT | Performed by: PLASTIC SURGERY

## 2022-10-06 RX ORDER — MUPIROCIN 20 MG/G
OINTMENT TOPICAL 3 TIMES DAILY
Qty: 30 G | Refills: 0 | Status: SHIPPED | OUTPATIENT
Start: 2022-10-06 | End: 2022-10-17

## 2022-10-06 RX ORDER — SACCHAROMYCES BOULARDII 250 MG
250 CAPSULE ORAL 2 TIMES DAILY
Qty: 20 CAPSULE | Refills: 0 | Status: SHIPPED | OUTPATIENT
Start: 2022-10-06 | End: 2022-10-16

## 2022-10-06 RX ORDER — DOCUSATE SODIUM 100 MG/1
100 CAPSULE, LIQUID FILLED ORAL 2 TIMES DAILY
Qty: 20 CAPSULE | Refills: 0 | Status: SHIPPED | OUTPATIENT
Start: 2022-10-06 | End: 2022-10-17

## 2022-10-06 RX ORDER — ONDANSETRON 4 MG/1
4 TABLET, FILM COATED ORAL EVERY 8 HOURS PRN
Qty: 9 TABLET | Refills: 0 | Status: SHIPPED | OUTPATIENT
Start: 2022-10-06 | End: 2022-10-17

## 2022-10-06 RX ORDER — CEPHALEXIN 500 MG/1
500 CAPSULE ORAL 3 TIMES DAILY
Qty: 21 CAPSULE | Refills: 0 | Status: SHIPPED | OUTPATIENT
Start: 2022-10-06 | End: 2022-10-17

## 2022-10-06 RX ORDER — HYDROCODONE BITARTRATE AND ACETAMINOPHEN 5; 325 MG/1; MG/1
1 TABLET ORAL EVERY 4 HOURS PRN
Qty: 25 TABLET | Refills: 0 | Status: SHIPPED | OUTPATIENT
Start: 2022-10-06 | End: 2022-10-10

## 2022-10-06 RX ORDER — FLUCONAZOLE 150 MG/1
150 TABLET ORAL
Qty: 3 TABLET | Refills: 0 | Status: SHIPPED | OUTPATIENT
Start: 2022-10-06 | End: 2022-10-13

## 2022-10-06 NOTE — LETTER
10/6/2022         RE: Marcelina Mcginnis  [address]  [facility] [address]        Dear Colleague,    Thank you for referring your patient, Marcelina Mcginnis, to the [facility]. Please see a copy of my visit note below.    Marcelina is a 47 years old lady with history of BRCA1 mutation and she is  scheduled with Dr. Eaton for prophylactic bilateral mastectomies.  I have met Marcelina in the past and we have discussed previously, her breast reconstruction which will be direct to implant breast reconstruction in the prepectoral space utilizing elliptical incisions encompassing her bilateral nipple areolar complexes.  Patient does not wish to preserve nipple areolar complex.    We discussed today details of her future surgery in the company of her .    All questions were answered, we also saw before and after pictures and we reviewed potential risk and complications.    I have prescribed to her Hibiclens and Bactroban to be utilized 7 days prior to her surgery date.  I also prescribed postoperative narcotics, antibiotics, stool softeners and antiemetics.    Patient has agreed to proceed.  She is ready for surgery.    Time spent with the patient and  and documenting in her chart 30 minutes.    Raymon Avery MD , FACS   Diplomate American Board of Plastic Surgery  Diplomate American Board of Surgery  Adj. Assistant Professor of Surgery  Division of Plastic & Reconstructive Surgery   [facility]  Office: [phone]   10/6/2022 at 9:18 PM         Again, thank you for allowing me to participate in the care of your patient.        Sincerely,        Raymon Avery MD

## 2022-10-07 NOTE — PROGRESS NOTES
Marcelina is a 47 years old lady with history of BRCA1 mutation and she is  scheduled with Dr. Eaton for prophylactic bilateral mastectomies.  I have met Marcelina in the past and we have discussed previously, her breast reconstruction which will be direct to implant breast reconstruction in the prepectoral space utilizing elliptical incisions encompassing her bilateral nipple areolar complexes.  Patient does not wish to preserve nipple areolar complex.    We discussed today details of her future surgery in the company of her .    All questions were answered, we also saw before and after pictures and we reviewed potential risk and complications.    I have prescribed to her Hibiclens and Bactroban to be utilized 7 days prior to her surgery date.  I also prescribed postoperative narcotics, antibiotics, stool softeners and antiemetics.    Patient has agreed to proceed.  She is ready for surgery.    Time spent with the patient and  and documenting in her chart 30 minutes.    Ryamon Avery MD , FACS   Diplomate American Board of Plastic Surgery  Diplomate American Board of Surgery  Adj. Assistant Professor of Surgery  Division of Plastic & Reconstructive Surgery   [facility]  Office: [phone]   10/6/2022 at 9:18 PM

## 2022-10-17 ENCOUNTER — OFFICE VISIT (OUTPATIENT)
Dept: FAMILY MEDICINE | Facility: CLINIC | Age: 47
End: 2022-10-17
Payer: COMMERCIAL

## 2022-10-17 VITALS
DIASTOLIC BLOOD PRESSURE: 72 MMHG | WEIGHT: 164 LBS | RESPIRATION RATE: 16 BRPM | SYSTOLIC BLOOD PRESSURE: 115 MMHG | OXYGEN SATURATION: 100 % | HEIGHT: 69 IN | HEART RATE: 64 BPM | TEMPERATURE: 98.1 F | BODY MASS INDEX: 24.29 KG/M2

## 2022-10-17 DIAGNOSIS — Z01.818 PREOPERATIVE EXAMINATION: Primary | ICD-10-CM

## 2022-10-17 DIAGNOSIS — H90.5 CONGENITAL HEARING LOSS: ICD-10-CM

## 2022-10-17 DIAGNOSIS — Z12.11 SCREEN FOR COLON CANCER: ICD-10-CM

## 2022-10-17 DIAGNOSIS — Z15.09 BRCA1 GENETIC CARRIER: ICD-10-CM

## 2022-10-17 DIAGNOSIS — Z23 NEEDS FLU SHOT: ICD-10-CM

## 2022-10-17 DIAGNOSIS — Z15.01 BRCA1 GENETIC CARRIER: ICD-10-CM

## 2022-10-17 LAB
ALBUMIN UR-MCNC: NEGATIVE MG/DL
APPEARANCE UR: CLEAR
BILIRUB UR QL STRIP: NEGATIVE
COLOR UR AUTO: YELLOW
GLUCOSE UR STRIP-MCNC: NEGATIVE MG/DL
HGB BLD-MCNC: 14.5 G/DL (ref 11.7–15.7)
HGB UR QL STRIP: ABNORMAL
KETONES UR STRIP-MCNC: NEGATIVE MG/DL
LEUKOCYTE ESTERASE UR QL STRIP: NEGATIVE
NITRATE UR QL: NEGATIVE
PH UR STRIP: 5 [PH] (ref 5–8)
SP GR UR STRIP: 1.01 (ref 1–1.03)
UROBILINOGEN UR STRIP-ACNC: 0.2 E.U./DL

## 2022-10-17 PROCEDURE — 36415 COLL VENOUS BLD VENIPUNCTURE: CPT | Performed by: FAMILY MEDICINE

## 2022-10-17 PROCEDURE — 99214 OFFICE O/P EST MOD 30 MIN: CPT | Mod: 25 | Performed by: FAMILY MEDICINE

## 2022-10-17 PROCEDURE — 90686 IIV4 VACC NO PRSV 0.5 ML IM: CPT | Performed by: FAMILY MEDICINE

## 2022-10-17 PROCEDURE — 81001 URINALYSIS AUTO W/SCOPE: CPT | Performed by: FAMILY MEDICINE

## 2022-10-17 PROCEDURE — 90471 IMMUNIZATION ADMIN: CPT | Performed by: FAMILY MEDICINE

## 2022-10-17 PROCEDURE — 85018 HEMOGLOBIN: CPT | Performed by: FAMILY MEDICINE

## 2022-10-17 ASSESSMENT — PAIN SCALES - GENERAL: PAINLEVEL: NO PAIN (0)

## 2022-10-17 NOTE — PROGRESS NOTES
[facility]  [address], [address]  [place] [address]  [facility] [address]  Phone: [phone]  Fax: [phone]  Primary Provider: Nargis Wilkerson  Pre-op Performing Provider: NARGIS WILKERSON      PREOPERATIVE EVALUATION:  Today's date: 10/17/2022    Marcelina Mcginnis is a 47 year old female who presents for a preoperative evaluation.    Surgical Information:  Surgery/Procedure: Bilateral mastectomies with reconstruction  Surgery Location: [facility]   Surgeon:  Dr Bennie Taylor   Surgery Date: 11/14/22   Time of Surgery: ?  Where patient plans to recover: At home with family  Fax number for surgical facility: Note does not need to be faxed, will be available electronically in Epic.    Type of Anesthesia Anticipated: General    Assessment & Plan      Assessment: Marcelina Mcginnis is a 47 year old female who presents for pre-op history and physical. She is scheduled to undergo bilateral mastectomies with reconstruction.       Plan:    She appears to be medically optimized for the planned procedure.    Labs were done as indicated below.  Recommendations were reviewed with the patient.  She is okay to take her medications on the morning of her surgery for small sip of water    The patient is approved for surgery and is considered to be low anesthetic risk.   Follow up as needed.    Please page me at [phone] if you have any questions or concerns regarding the care of this patient.     The proposed surgical procedure is considered LOW risk.    Preoperative examination  - Hemoglobin  - UA reflex to Microscopic and Culture  - Hemoglobin  - UA reflex to Microscopic and Culture  - Urine Microscopic    BRCA1 genetic carrier  - Hemoglobin  - UA reflex to Microscopic and Culture  - Hemoglobin  - UA reflex to Microscopic and Culture  - Urine Microscopic    Congenital hearing loss    Screen for colon cancer    Needs flu shot  - INFLUENZA VACCINE IM >6 MO  VALENT IIV4 (ALFURIA/FLUZONE)    Patient appears to be medically optimized for the upcoming procedure.  She did get a flu shot today.  She declined a COVID vaccination.  She does have congenital hearing loss.  She does wear bilateral hearing aids.  Overall she feels comfortable with the procedure and with her decision to have the mastectomies.  She is comfortable with what is to come in the reconstruction etc.  She will do home COVID test prior to surgery.  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  She does get them in the spring and the fall but overall feels like things are under good control.  She is on birth control pills for hot flashes as well as her mood swings and those seem to be doing well as well.  She will call her insurance regarding colonoscopy and we may need to order that in the future.  My chart noted that she was due for hepatitis B vaccinations but she has been tested and is immune after a titer was drawn.       RECOMMENDATION:  APPROVAL GIVEN to proceed with proposed procedure, without further diagnostic evaluation.            Subjective     HPI related to upcoming procedure:   HPI: Marcelina is a 47 year old female here for a pre-operative consultation. The exam is requested by Dr. Eaton/Bennie in preparation for bilateral mastectomies with reconstruction to be performed at [facility] on 11/14/22. Today s examination on 10/17/2022 is done to review the underlying surgical condition of BRCA1 positive as well as to clear for anesthesia and review her medical problems and make appropriate changes in medications.  Patient has known that she has positive BRCA1 mutation for several years.  She has previously undergone oophorectomy for this reason.  She now presents for prophylactic bilateral mastectomies due to the BRCA1 mutation.  Patient is going to have mastectomies and then have reconstruction of her breasts at the same time.  She otherwise is a very healthy  female.  She does have congenital hearing loss and does wear bilateral hearing aids.    Preop Questions 10/10/2022   1. Have you ever had a heart attack or stroke? No   2. Have you ever had surgery on your heart or blood vessels, such as a stent placement, a coronary artery bypass, or surgery on an artery in your head, neck, heart, or legs? No   3. Do you have chest pain with activity? No   4. Do you have a history of  heart failure? No   5. Do you currently have a cold, bronchitis or symptoms of other infection? No   6. Do you have a cough, shortness of breath, or wheezing? No   7. Do you or anyone in your family have previous history of blood clots? No   8. Do you or does anyone in your family have a serious bleeding problem such as prolonged bleeding following surgeries or cuts? No   9. Have you ever had problems with anemia or been told to take iron pills? No   10. Have you had any abnormal blood loss such as black, tarry or bloody stools, or abnormal vaginal bleeding? No   11. Have you ever had a blood transfusion? Not as far as she knows   12. Are you willing to have a blood transfusion if it is medically needed before, during, or after your surgery? Yes   13. Have you or any of your relatives ever had problems with anesthesia? No   14. Do you have sleep apnea, excessive snoring or daytime drowsiness? No   15. Do you have any artifical heart valves or other implanted medical devices like a pacemaker, defibrillator, or continuous glucose monitor? No   16. Do you have artificial joints? No   17. Are you allergic to latex? No   18. Is there any chance that you may be pregnant? No     History of Present Illness  Recent Health  Fever: no  Chills: no  Fatigue: no  Chest Pain: no  Cough: no  Dyspnea: no  Urinary Frequency: no  Nausea: no  Vomiting: no  Diarrhea: no  Abdominal Pain: no  Easy Bruising: no  Lower Extremity Swelling: no  Poor Exercise Tolerance: no    Pertinent History  Prior Anesthesia: yes  Previous  Anesthesia Reaction:  no  Diabetes: no  Cardiovascular Disease: no  Pulmonary Disease: no  Renal Disease: no  GI Disease: no  Sleep Apnea: no  Thromboembolic Problems: no  Clotting Disorder: no  Bleeding Disorder: no  Transfusion Reaction: no  Impaired Immunity: no  Steroid use in the last 6 months: no  Frequent Aspirin use: no    No family history of anesthesia reaction, seizure, aneurysm, sudden death, clotting disorder or bleeding disorder.       Health Care Directive:  Patient does not have a Health Care Directive or Living Will: Discussed advance care planning with patient; however, patient declined at this time.  Patient was given [employer] paperwork today.    Preoperative Review of :   reviewed - no record of controlled substances prescribed.        Review of Systems  See below    Patient Active Problem List    Diagnosis Date Noted     BRCA1 genetic carrier 03/11/2018     Priority: Medium     Congenital hearing loss 03/11/2018     Priority: Medium      Past Medical History:   Diagnosis Date     BRCA positive     needs yearly mammo     Congenital hearing loss     wears bilateral hearing aids     H/O cold sores      Hepatitis B immune     titer drawn and is immune     Past Surgical History:   Procedure Laterality Date     BILATERAL OOPHORECTOMY  2017    due to BRCA positive gene and family history of ovarian cancer     Current Outpatient Medications   Medication Sig Dispense Refill     norethindrone-ethinyl estradiol (JUNEL 1/20) 1-20 MG-MCG tablet TAKE 1 TABELT BY MOUTH DAILY. START A NEW PACK EVERY 3 WEEKS 84 tablet 4     valACYclovir (VALTREX) 1000 mg tablet Take 2 tablets (2,000 mg) by mouth 2 times daily for 1 day 4 tablet 3       No Known Allergies     Social History     Tobacco Use     Smoking status: Former     Packs/day: 0.50     Years: 15.00     Pack years: 7.50     Types: Cigarettes     Smokeless tobacco: Never   Substance Use Topics     Alcohol use: Yes     Alcohol/week: 2.0 standard  "drinks     Types: 2 Standard drinks or equivalent per week     Family History   Problem Relation Age of Onset     Hereditary Breast and Ovarian Cancer Syndrome Mother         positive for gene     Heart Disease Father         MI age 42     Hereditary Breast and Ovarian Cancer Syndrome Sister         BRCA1 +     Ovarian Cancer Maternal Grandmother 54     Heart Disease Paternal Grandfather         MI, age 42     History   Drug Use No         Objective     /72   Pulse 64   Temp 98.1  F (36.7  C)   Resp 16   Ht 1.753 m (5' 9\")   Wt 74.4 kg (164 lb)   SpO2 100%   Breastfeeding No   BMI 24.22 kg/m      Physical Exam  Review of Systems:  Denies fever, chills, visual changes, fatigue, myalgias, nasal congestion, rhinorrhea, ear pain or discharge, sore throat, swollen glands, breast mass, nipple discharge, breast changes, abdominal pain, nausea, vomiting, diarrhea, constipation, cough, shortness of breath, chest pain, weight change, change in bowel habits, melena, rectal bleeding, dysuria, frequency, urgency, hematuria, polyuria, polydipsia, polyphagia, joint pain or swelling or erythema, edema, rash, weakness, paresthesias, vaginal discharge or bleeding or mood changes.  Remainder of review of systems was negative.    Positives: feeling well      Objective:   /72   Pulse 64   Temp 98.1  F (36.7  C)   Resp 16   Ht 1.753 m (5' 9\")   Wt 74.4 kg (164 lb)   SpO2 100%   Breastfeeding No   BMI 24.22 kg/m          Physical Exam:  General Appearance: Alert, cooperative, no distress, appears stated age  Head: Normocephalic, without obvious abnormality, atraumatic  Eyes: PERRL, conjunctiva/corneas clear, EOM's intact  Ears: Normal TM's and external ear canals, both ears  Nose: Nares normal, septum midline,mucosa normal, no drainage  Throat: Lips, mucosa, and tongue normal; teeth and gums normal  Neck: Supple, symmetrical, trachea midline, no adenopathy;  thyroid: not enlarged, symmetric, no " tenderness/mass/nodules  Back: Symmetric, no curvature, ROM normal, no CVA tenderness  Lungs: Clear to auscultation bilaterally, respirations unlabored  Heart: Regular rate and rhythm, S1 and S2 normal, no murmur, rub, or gallop  Abdomen: Soft, non-tender, bowel sounds active all four quadrants,  no masses, no organomegaly  Extremities: Extremities normal, atraumatic, no cyanosis or edema  Skin: Skin color, texture, turgor normal, no rashes or lesions  Lymph nodes: Cervical and supraclavicular nodes normal  Neurologic: Normal         Recent Results (from the past 240 hour(s))   Hemoglobin    Collection Time: 10/17/22  4:04 PM   Result Value Ref Range    Hemoglobin 14.5 11.7 - 15.7 g/dL   UA reflex to Microscopic and Culture    Collection Time: 10/17/22  4:08 PM    Specimen: Urine, Clean Catch   Result Value Ref Range    Color Urine Yellow Colorless, Straw, Light Yellow, Yellow    Appearance Urine Clear Clear    Glucose Urine Negative Negative mg/dL    Bilirubin Urine Negative Negative    Ketones Urine Negative Negative mg/dL    Specific Gravity Urine 1.015 1.005 - 1.030    Blood Urine Small (A) Negative    pH Urine 5.0 5.0 - 8.0    Protein Albumin Urine Negative Negative mg/dL    Urobilinogen Urine 0.2 0.2, 1.0 E.U./dL    Nitrite Urine Negative Negative    Leukocyte Esterase Urine Negative Negative   Urine Microscopic    Collection Time: 10/17/22  4:08 PM   Result Value Ref Range    Bacteria Urine Few (A) None Seen /HPF    RBC Urine 2-5 (A) 0-2 /HPF /HPF    WBC Urine 0-5 0-5 /HPF /HPF    Squamous Epithelials Urine Moderate (A) None Seen /LPF         Nargis Hurley M.D.  [address]  [place] [address]  Ph: [phone] Fax: [phone]  Pager [phone]    Recent Labs   Lab Test 03/21/22  1456 02/05/21  1400   HGB 15.9* 15.2          Revised Cardiac Risk Index (RCRI):  The patient has the following serious cardiovascular risks for perioperative complications:   - No serious cardiac risks =  0 points     RCRI Interpretation: 0 points: Class I (very low risk - 0.4% complication rate)           Signed Electronically by: Nargis Hurley MD  Copy of this evaluation report is provided to requesting physician.

## 2022-10-17 NOTE — H&P (VIEW-ONLY)
[facility]  [address], [address]  [place] [address]  [facility] [address]  Phone: [phone]  Fax: [phone]  Primary Provider: Nargis Wilkerson  Pre-op Performing Provider: NARGIS WILKERSON      PREOPERATIVE EVALUATION:  Today's date: 10/17/2022    Marcelina Mcginnis is a 47 year old female who presents for a preoperative evaluation.    Surgical Information:  Surgery/Procedure: Bilateral mastectomies with reconstruction  Surgery Location: [facility]   Surgeon:  Dr Bennie Taylor   Surgery Date: 11/14/22   Time of Surgery: ?  Where patient plans to recover: At home with family  Fax number for surgical facility: Note does not need to be faxed, will be available electronically in Epic.    Type of Anesthesia Anticipated: General    Assessment & Plan      Assessment: Marcelina Mcginnis is a 47 year old female who presents for pre-op history and physical. She is scheduled to undergo bilateral mastectomies with reconstruction.       Plan:    She appears to be medically optimized for the planned procedure.    Labs were done as indicated below.  Recommendations were reviewed with the patient.  She is okay to take her medications on the morning of her surgery for small sip of water    The patient is approved for surgery and is considered to be low anesthetic risk.   Follow up as needed.    Please page me at [phone] if you have any questions or concerns regarding the care of this patient.     The proposed surgical procedure is considered LOW risk.    Preoperative examination  - Hemoglobin  - UA reflex to Microscopic and Culture  - Hemoglobin  - UA reflex to Microscopic and Culture  - Urine Microscopic    BRCA1 genetic carrier  - Hemoglobin  - UA reflex to Microscopic and Culture  - Hemoglobin  - UA reflex to Microscopic and Culture  - Urine Microscopic    Congenital hearing loss    Screen for colon cancer    Needs flu shot  - INFLUENZA VACCINE IM >6 MO  VALENT IIV4 (ALFURIA/FLUZONE)    Patient appears to be medically optimized for the upcoming procedure.  She did get a flu shot today.  She declined a COVID vaccination.  She does have congenital hearing loss.  She does wear bilateral hearing aids.  Overall she feels comfortable with the procedure and with her decision to have the mastectomies.  She is comfortable with what is to come in the reconstruction etc.  She will do home COVID test prior to surgery.  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  She does get them in the spring and the fall but overall feels like things are under good control.  She is on birth control pills for hot flashes as well as her mood swings and those seem to be doing well as well.  She will call her insurance regarding colonoscopy and we may need to order that in the future.  My chart noted that she was due for hepatitis B vaccinations but she has been tested and is immune after a titer was drawn.       RECOMMENDATION:  APPROVAL GIVEN to proceed with proposed procedure, without further diagnostic evaluation.            Subjective     HPI related to upcoming procedure:   HPI: Marcelina is a 47 year old female here for a pre-operative consultation. The exam is requested by Dr. Eaton/Bennie in preparation for bilateral mastectomies with reconstruction to be performed at [facility] on 11/14/22. Today s examination on 10/17/2022 is done to review the underlying surgical condition of BRCA1 positive as well as to clear for anesthesia and review her medical problems and make appropriate changes in medications.  Patient has known that she has positive BRCA1 mutation for several years.  She has previously undergone oophorectomy for this reason.  She now presents for prophylactic bilateral mastectomies due to the BRCA1 mutation.  Patient is going to have mastectomies and then have reconstruction of her breasts at the same time.  She otherwise is a very healthy  female.  She does have congenital hearing loss and does wear bilateral hearing aids.    Preop Questions 10/10/2022   1. Have you ever had a heart attack or stroke? No   2. Have you ever had surgery on your heart or blood vessels, such as a stent placement, a coronary artery bypass, or surgery on an artery in your head, neck, heart, or legs? No   3. Do you have chest pain with activity? No   4. Do you have a history of  heart failure? No   5. Do you currently have a cold, bronchitis or symptoms of other infection? No   6. Do you have a cough, shortness of breath, or wheezing? No   7. Do you or anyone in your family have previous history of blood clots? No   8. Do you or does anyone in your family have a serious bleeding problem such as prolonged bleeding following surgeries or cuts? No   9. Have you ever had problems with anemia or been told to take iron pills? No   10. Have you had any abnormal blood loss such as black, tarry or bloody stools, or abnormal vaginal bleeding? No   11. Have you ever had a blood transfusion? Not as far as she knows   12. Are you willing to have a blood transfusion if it is medically needed before, during, or after your surgery? Yes   13. Have you or any of your relatives ever had problems with anesthesia? No   14. Do you have sleep apnea, excessive snoring or daytime drowsiness? No   15. Do you have any artifical heart valves or other implanted medical devices like a pacemaker, defibrillator, or continuous glucose monitor? No   16. Do you have artificial joints? No   17. Are you allergic to latex? No   18. Is there any chance that you may be pregnant? No     History of Present Illness  Recent Health  Fever: no  Chills: no  Fatigue: no  Chest Pain: no  Cough: no  Dyspnea: no  Urinary Frequency: no  Nausea: no  Vomiting: no  Diarrhea: no  Abdominal Pain: no  Easy Bruising: no  Lower Extremity Swelling: no  Poor Exercise Tolerance: no    Pertinent History  Prior Anesthesia: yes  Previous  Anesthesia Reaction:  no  Diabetes: no  Cardiovascular Disease: no  Pulmonary Disease: no  Renal Disease: no  GI Disease: no  Sleep Apnea: no  Thromboembolic Problems: no  Clotting Disorder: no  Bleeding Disorder: no  Transfusion Reaction: no  Impaired Immunity: no  Steroid use in the last 6 months: no  Frequent Aspirin use: no    No family history of anesthesia reaction, seizure, aneurysm, sudden death, clotting disorder or bleeding disorder.       Health Care Directive:  Patient does not have a Health Care Directive or Living Will: Discussed advance care planning with patient; however, patient declined at this time.  Patient was given [employer] paperwork today.    Preoperative Review of :   reviewed - no record of controlled substances prescribed.        Review of Systems  See below    Patient Active Problem List    Diagnosis Date Noted     BRCA1 genetic carrier 03/11/2018     Priority: Medium     Congenital hearing loss 03/11/2018     Priority: Medium      Past Medical History:   Diagnosis Date     BRCA positive     needs yearly mammo     Congenital hearing loss     wears bilateral hearing aids     H/O cold sores      Hepatitis B immune     titer drawn and is immune     Past Surgical History:   Procedure Laterality Date     BILATERAL OOPHORECTOMY  2017    due to BRCA positive gene and family history of ovarian cancer     Current Outpatient Medications   Medication Sig Dispense Refill     norethindrone-ethinyl estradiol (JUNEL 1/20) 1-20 MG-MCG tablet TAKE 1 TABELT BY MOUTH DAILY. START A NEW PACK EVERY 3 WEEKS 84 tablet 4     valACYclovir (VALTREX) 1000 mg tablet Take 2 tablets (2,000 mg) by mouth 2 times daily for 1 day 4 tablet 3       No Known Allergies     Social History     Tobacco Use     Smoking status: Former     Packs/day: 0.50     Years: 15.00     Pack years: 7.50     Types: Cigarettes     Smokeless tobacco: Never   Substance Use Topics     Alcohol use: Yes     Alcohol/week: 2.0 standard  "drinks     Types: 2 Standard drinks or equivalent per week     Family History   Problem Relation Age of Onset     Hereditary Breast and Ovarian Cancer Syndrome Mother         positive for gene     Heart Disease Father         MI age 42     Hereditary Breast and Ovarian Cancer Syndrome Sister         BRCA1 +     Ovarian Cancer Maternal Grandmother 54     Heart Disease Paternal Grandfather         MI, age 42     History   Drug Use No         Objective     /72   Pulse 64   Temp 98.1  F (36.7  C)   Resp 16   Ht 1.753 m (5' 9\")   Wt 74.4 kg (164 lb)   SpO2 100%   Breastfeeding No   BMI 24.22 kg/m      Physical Exam  Review of Systems:  Denies fever, chills, visual changes, fatigue, myalgias, nasal congestion, rhinorrhea, ear pain or discharge, sore throat, swollen glands, breast mass, nipple discharge, breast changes, abdominal pain, nausea, vomiting, diarrhea, constipation, cough, shortness of breath, chest pain, weight change, change in bowel habits, melena, rectal bleeding, dysuria, frequency, urgency, hematuria, polyuria, polydipsia, polyphagia, joint pain or swelling or erythema, edema, rash, weakness, paresthesias, vaginal discharge or bleeding or mood changes.  Remainder of review of systems was negative.    Positives: feeling well      Objective:   /72   Pulse 64   Temp 98.1  F (36.7  C)   Resp 16   Ht 1.753 m (5' 9\")   Wt 74.4 kg (164 lb)   SpO2 100%   Breastfeeding No   BMI 24.22 kg/m          Physical Exam:  General Appearance: Alert, cooperative, no distress, appears stated age  Head: Normocephalic, without obvious abnormality, atraumatic  Eyes: PERRL, conjunctiva/corneas clear, EOM's intact  Ears: Normal TM's and external ear canals, both ears  Nose: Nares normal, septum midline,mucosa normal, no drainage  Throat: Lips, mucosa, and tongue normal; teeth and gums normal  Neck: Supple, symmetrical, trachea midline, no adenopathy;  thyroid: not enlarged, symmetric, no " tenderness/mass/nodules  Back: Symmetric, no curvature, ROM normal, no CVA tenderness  Lungs: Clear to auscultation bilaterally, respirations unlabored  Heart: Regular rate and rhythm, S1 and S2 normal, no murmur, rub, or gallop  Abdomen: Soft, non-tender, bowel sounds active all four quadrants,  no masses, no organomegaly  Extremities: Extremities normal, atraumatic, no cyanosis or edema  Skin: Skin color, texture, turgor normal, no rashes or lesions  Lymph nodes: Cervical and supraclavicular nodes normal  Neurologic: Normal         Recent Results (from the past 240 hour(s))   Hemoglobin    Collection Time: 10/17/22  4:04 PM   Result Value Ref Range    Hemoglobin 14.5 11.7 - 15.7 g/dL   UA reflex to Microscopic and Culture    Collection Time: 10/17/22  4:08 PM    Specimen: Urine, Clean Catch   Result Value Ref Range    Color Urine Yellow Colorless, Straw, Light Yellow, Yellow    Appearance Urine Clear Clear    Glucose Urine Negative Negative mg/dL    Bilirubin Urine Negative Negative    Ketones Urine Negative Negative mg/dL    Specific Gravity Urine 1.015 1.005 - 1.030    Blood Urine Small (A) Negative    pH Urine 5.0 5.0 - 8.0    Protein Albumin Urine Negative Negative mg/dL    Urobilinogen Urine 0.2 0.2, 1.0 E.U./dL    Nitrite Urine Negative Negative    Leukocyte Esterase Urine Negative Negative   Urine Microscopic    Collection Time: 10/17/22  4:08 PM   Result Value Ref Range    Bacteria Urine Few (A) None Seen /HPF    RBC Urine 2-5 (A) 0-2 /HPF /HPF    WBC Urine 0-5 0-5 /HPF /HPF    Squamous Epithelials Urine Moderate (A) None Seen /LPF         Nargis Hurley M.D.  [address]  [place] [address]  Ph: [phone] Fax: [phone]  Pager [phone]    Recent Labs   Lab Test 03/21/22  1456 02/05/21  1400   HGB 15.9* 15.2          Revised Cardiac Risk Index (RCRI):  The patient has the following serious cardiovascular risks for perioperative complications:   - No serious cardiac risks =  0 points     RCRI Interpretation: 0 points: Class I (very low risk - 0.4% complication rate)           Signed Electronically by: Nargis Hurley MD  Copy of this evaluation report is provided to requesting physician.

## 2022-10-18 DIAGNOSIS — R82.90 ABNORMAL URINALYSIS: Primary | ICD-10-CM

## 2022-10-18 LAB
BACTERIA #/AREA URNS HPF: ABNORMAL /HPF
RBC #/AREA URNS AUTO: ABNORMAL /HPF
SQUAMOUS #/AREA URNS AUTO: ABNORMAL /LPF
WBC #/AREA URNS AUTO: ABNORMAL /HPF

## 2022-11-08 ENCOUNTER — MYC MEDICAL ADVICE (OUTPATIENT)
Dept: FAMILY MEDICINE | Facility: CLINIC | Age: 47
End: 2022-11-08

## 2022-11-08 ENCOUNTER — LAB (OUTPATIENT)
Dept: LAB | Facility: CLINIC | Age: 47
End: 2022-11-08
Payer: COMMERCIAL

## 2022-11-08 DIAGNOSIS — Z87.440 PERSONAL HISTORY OF URINARY TRACT INFECTION: ICD-10-CM

## 2022-11-08 DIAGNOSIS — Z87.440 PERSONAL HISTORY OF URINARY TRACT INFECTION: Primary | ICD-10-CM

## 2022-11-08 LAB
ALBUMIN UR-MCNC: NEGATIVE MG/DL
APPEARANCE UR: CLEAR
BACTERIA #/AREA URNS HPF: ABNORMAL /HPF
BILIRUB UR QL STRIP: NEGATIVE
COLOR UR AUTO: YELLOW
GLUCOSE UR STRIP-MCNC: NEGATIVE MG/DL
HGB UR QL STRIP: ABNORMAL
KETONES UR STRIP-MCNC: NEGATIVE MG/DL
LEUKOCYTE ESTERASE UR QL STRIP: NEGATIVE
NITRATE UR QL: NEGATIVE
PH UR STRIP: 5.5 [PH] (ref 5–8)
RBC #/AREA URNS AUTO: ABNORMAL /HPF
SP GR UR STRIP: 1.01 (ref 1–1.03)
SQUAMOUS #/AREA URNS AUTO: ABNORMAL /LPF
UROBILINOGEN UR STRIP-ACNC: 0.2 E.U./DL
WBC #/AREA URNS AUTO: ABNORMAL /HPF

## 2022-11-08 PROCEDURE — 81001 URINALYSIS AUTO W/SCOPE: CPT

## 2022-11-08 NOTE — TELEPHONE ENCOUNTER
Patient called to leave message for Dr. Hurley regarding UTI. I did transfer patient to be triage. Patient waited for half an hour and declined to wait any longer. Requesting message get sent to Dr. Hurley instead.

## 2022-11-08 NOTE — TELEPHONE ENCOUNTER
It appears there is an appointment scheduled for this afternoon and there is an order already placed.

## 2022-11-13 ENCOUNTER — ANESTHESIA EVENT (OUTPATIENT)
Dept: SURGERY | Facility: HOSPITAL | Age: 47
End: 2022-11-13
Payer: COMMERCIAL

## 2022-11-14 ENCOUNTER — MEDICAL CORRESPONDENCE (OUTPATIENT)
Dept: HEALTH INFORMATION MANAGEMENT | Facility: CLINIC | Age: 47
End: 2022-11-14

## 2022-11-14 ENCOUNTER — HOSPITAL ENCOUNTER (OUTPATIENT)
Facility: HOSPITAL | Age: 47
Discharge: HOME | End: 2022-11-14
Attending: SPECIALIST | Admitting: SPECIALIST
Payer: COMMERCIAL

## 2022-11-14 ENCOUNTER — ANESTHESIA (OUTPATIENT)
Dept: SURGERY | Facility: HOSPITAL | Age: 47
End: 2022-11-14
Payer: COMMERCIAL

## 2022-11-14 VITALS
TEMPERATURE: 98.2 F | SYSTOLIC BLOOD PRESSURE: 116 MMHG | OXYGEN SATURATION: 98 % | WEIGHT: 164.5 LBS | RESPIRATION RATE: 18 BRPM | HEART RATE: 73 BPM | BODY MASS INDEX: 24.29 KG/M2 | DIASTOLIC BLOOD PRESSURE: 72 MMHG

## 2022-11-14 PROCEDURE — C1760 CLOSURE DEV, VASC: HCPCS | Performed by: SPECIALIST

## 2022-11-14 PROCEDURE — 258N000001 HC RX 258: Performed by: PLASTIC SURGERY

## 2022-11-14 PROCEDURE — 250N000011 HC RX IP 250 OP 636: Performed by: PLASTIC SURGERY

## 2022-11-14 PROCEDURE — 258N000003 HC RX IP 258 OP 636: Performed by: ANESTHESIOLOGY

## 2022-11-14 PROCEDURE — 19303 MAST SIMPLE COMPLETE: CPT | Mod: 50 | Performed by: SPECIALIST

## 2022-11-14 PROCEDURE — 258N000003 HC RX IP 258 OP 636: Performed by: NURSE ANESTHETIST, CERTIFIED REGISTERED

## 2022-11-14 PROCEDURE — 710N000009 HC RECOVERY PHASE 1, LEVEL 1, PER MIN: Performed by: SPECIALIST

## 2022-11-14 PROCEDURE — 250N000013 HC RX MED GY IP 250 OP 250 PS 637: Performed by: ANESTHESIOLOGY

## 2022-11-14 PROCEDURE — 250N000009 HC RX 250: Performed by: NURSE ANESTHETIST, CERTIFIED REGISTERED

## 2022-11-14 PROCEDURE — 272N000001 HC OR GENERAL SUPPLY STERILE: Performed by: SPECIALIST

## 2022-11-14 PROCEDURE — 250N000011 HC RX IP 250 OP 636: Performed by: NURSE ANESTHETIST, CERTIFIED REGISTERED

## 2022-11-14 PROCEDURE — 710N000012 HC RECOVERY PHASE 2, PER MINUTE: Performed by: SPECIALIST

## 2022-11-14 PROCEDURE — 250N000025 HC SEVOFLURANE, PER MIN: Performed by: SPECIALIST

## 2022-11-14 PROCEDURE — 250N000011 HC RX IP 250 OP 636: Performed by: ANESTHESIOLOGY

## 2022-11-14 PROCEDURE — 88307 TISSUE EXAM BY PATHOLOGIST: CPT | Mod: 26 | Performed by: PATHOLOGY

## 2022-11-14 PROCEDURE — 360N000076 HC SURGERY LEVEL 3, PER MIN: Performed by: SPECIALIST

## 2022-11-14 PROCEDURE — 88307 TISSUE EXAM BY PATHOLOGIST: CPT | Mod: TC | Performed by: SPECIALIST

## 2022-11-14 PROCEDURE — 370N000017 HC ANESTHESIA TECHNICAL FEE, PER MIN: Performed by: SPECIALIST

## 2022-11-14 PROCEDURE — 19340 INSJ BREAST IMPLT SM D MAST: CPT | Mod: 50 | Performed by: PLASTIC SURGERY

## 2022-11-14 PROCEDURE — 250N000009 HC RX 250: Performed by: PLASTIC SURGERY

## 2022-11-14 PROCEDURE — 278N000051 HC OR IMPLANT GENERAL: Performed by: SPECIALIST

## 2022-11-14 PROCEDURE — 999N000141 HC STATISTIC PRE-PROCEDURE NURSING ASSESSMENT: Performed by: SPECIALIST

## 2022-11-14 DEVICE — NATRELLE INSPIRA SCF 265CC FULL PROFILE  SMOOTH ROUND SILICONE
Type: IMPLANTABLE DEVICE | Site: BREAST | Status: FUNCTIONAL
Brand: NATRELLE INSPIRA COHESIVE BREAST IMPLANTS

## 2022-11-14 RX ORDER — MEPERIDINE HYDROCHLORIDE 25 MG/ML
12.5 INJECTION INTRAMUSCULAR; INTRAVENOUS; SUBCUTANEOUS
Status: DISCONTINUED | OUTPATIENT
Start: 2022-11-14 | End: 2022-11-14 | Stop reason: HOSPADM

## 2022-11-14 RX ORDER — CEFAZOLIN SODIUM/WATER 2 G/20 ML
2 SYRINGE (ML) INTRAVENOUS
Status: COMPLETED | OUTPATIENT
Start: 2022-11-14 | End: 2022-11-14

## 2022-11-14 RX ORDER — PROPOFOL 10 MG/ML
INJECTION, EMULSION INTRAVENOUS PRN
Status: DISCONTINUED | OUTPATIENT
Start: 2022-11-14 | End: 2022-11-14

## 2022-11-14 RX ORDER — SODIUM CHLORIDE, SODIUM LACTATE, POTASSIUM CHLORIDE, CALCIUM CHLORIDE 600; 310; 30; 20 MG/100ML; MG/100ML; MG/100ML; MG/100ML
INJECTION, SOLUTION INTRAVENOUS CONTINUOUS
Status: DISCONTINUED | OUTPATIENT
Start: 2022-11-14 | End: 2022-11-14 | Stop reason: HOSPADM

## 2022-11-14 RX ORDER — KETAMINE HYDROCHLORIDE 10 MG/ML
INJECTION INTRAMUSCULAR; INTRAVENOUS PRN
Status: DISCONTINUED | OUTPATIENT
Start: 2022-11-14 | End: 2022-11-14

## 2022-11-14 RX ORDER — INDOCYANINE GREEN AND WATER 25 MG
KIT INJECTION PRN
Status: DISCONTINUED | OUTPATIENT
Start: 2022-11-14 | End: 2022-11-14

## 2022-11-14 RX ORDER — OXYCODONE HYDROCHLORIDE 5 MG/1
5 TABLET ORAL EVERY 4 HOURS PRN
Status: DISCONTINUED | OUTPATIENT
Start: 2022-11-14 | End: 2022-11-14 | Stop reason: HOSPADM

## 2022-11-14 RX ORDER — ONDANSETRON 2 MG/ML
INJECTION INTRAMUSCULAR; INTRAVENOUS PRN
Status: DISCONTINUED | OUTPATIENT
Start: 2022-11-14 | End: 2022-11-14

## 2022-11-14 RX ORDER — ONDANSETRON 2 MG/ML
4 INJECTION INTRAMUSCULAR; INTRAVENOUS EVERY 30 MIN PRN
Status: DISCONTINUED | OUTPATIENT
Start: 2022-11-14 | End: 2022-11-14 | Stop reason: HOSPADM

## 2022-11-14 RX ORDER — CEFAZOLIN SODIUM/WATER 2 G/20 ML
2 SYRINGE (ML) INTRAVENOUS SEE ADMIN INSTRUCTIONS
Status: DISCONTINUED | OUTPATIENT
Start: 2022-11-14 | End: 2022-11-14 | Stop reason: HOSPADM

## 2022-11-14 RX ORDER — LIDOCAINE HYDROCHLORIDE 20 MG/ML
INJECTION, SOLUTION INFILTRATION; PERINEURAL PRN
Status: DISCONTINUED | OUTPATIENT
Start: 2022-11-14 | End: 2022-11-14

## 2022-11-14 RX ORDER — NALOXONE HYDROCHLORIDE 1 MG/ML
0.4 INJECTION INTRAMUSCULAR; INTRAVENOUS; SUBCUTANEOUS
Status: DISCONTINUED | OUTPATIENT
Start: 2022-11-14 | End: 2022-11-14 | Stop reason: HOSPADM

## 2022-11-14 RX ORDER — LIDOCAINE 40 MG/G
CREAM TOPICAL
Status: DISCONTINUED | OUTPATIENT
Start: 2022-11-14 | End: 2022-11-14 | Stop reason: HOSPADM

## 2022-11-14 RX ORDER — NALOXONE HYDROCHLORIDE 1 MG/ML
0.2 INJECTION INTRAMUSCULAR; INTRAVENOUS; SUBCUTANEOUS
Status: DISCONTINUED | OUTPATIENT
Start: 2022-11-14 | End: 2022-11-14 | Stop reason: HOSPADM

## 2022-11-14 RX ORDER — FENTANYL CITRATE 50 UG/ML
25 INJECTION, SOLUTION INTRAMUSCULAR; INTRAVENOUS
Status: DISCONTINUED | OUTPATIENT
Start: 2022-11-14 | End: 2022-11-14 | Stop reason: HOSPADM

## 2022-11-14 RX ORDER — FENTANYL CITRATE 50 UG/ML
25 INJECTION, SOLUTION INTRAMUSCULAR; INTRAVENOUS EVERY 5 MIN PRN
Status: DISCONTINUED | OUTPATIENT
Start: 2022-11-14 | End: 2022-11-14 | Stop reason: HOSPADM

## 2022-11-14 RX ORDER — PROPOFOL 10 MG/ML
INJECTION, EMULSION INTRAVENOUS CONTINUOUS PRN
Status: DISCONTINUED | OUTPATIENT
Start: 2022-11-14 | End: 2022-11-14

## 2022-11-14 RX ORDER — GLYCOPYRROLATE 0.2 MG/ML
INJECTION, SOLUTION INTRAMUSCULAR; INTRAVENOUS PRN
Status: DISCONTINUED | OUTPATIENT
Start: 2022-11-14 | End: 2022-11-14

## 2022-11-14 RX ORDER — MAGNESIUM SULFATE 4 G/50ML
4 INJECTION INTRAVENOUS ONCE
Status: COMPLETED | OUTPATIENT
Start: 2022-11-14 | End: 2022-11-14

## 2022-11-14 RX ORDER — ACETAMINOPHEN 325 MG/1
975 TABLET ORAL ONCE
Status: COMPLETED | OUTPATIENT
Start: 2022-11-14 | End: 2022-11-14

## 2022-11-14 RX ORDER — ONDANSETRON 4 MG/1
4 TABLET, ORALLY DISINTEGRATING ORAL EVERY 30 MIN PRN
Status: DISCONTINUED | OUTPATIENT
Start: 2022-11-14 | End: 2022-11-14 | Stop reason: HOSPADM

## 2022-11-14 RX ORDER — ACETAMINOPHEN 650 MG
TABLET, EXTENDED RELEASE ORAL PRN
Status: DISCONTINUED | OUTPATIENT
Start: 2022-11-14 | End: 2022-11-14 | Stop reason: HOSPADM

## 2022-11-14 RX ORDER — TRANEXAMIC ACID 100 MG/ML
INJECTION, SOLUTION INTRAVENOUS PRN
Status: DISCONTINUED | OUTPATIENT
Start: 2022-11-14 | End: 2022-11-14

## 2022-11-14 RX ORDER — CEFAZOLIN SODIUM/WATER 2 G/20 ML
2 SYRINGE (ML) INTRAVENOUS
Status: DISCONTINUED | OUTPATIENT
Start: 2022-11-14 | End: 2022-11-14

## 2022-11-14 RX ORDER — FENTANYL CITRATE 50 UG/ML
INJECTION, SOLUTION INTRAMUSCULAR; INTRAVENOUS PRN
Status: DISCONTINUED | OUTPATIENT
Start: 2022-11-14 | End: 2022-11-14

## 2022-11-14 RX ORDER — DEXAMETHASONE SODIUM PHOSPHATE 10 MG/ML
INJECTION, SOLUTION INTRAMUSCULAR; INTRAVENOUS PRN
Status: DISCONTINUED | OUTPATIENT
Start: 2022-11-14 | End: 2022-11-14

## 2022-11-14 RX ORDER — TRANEXAMIC ACID 100 MG/ML
INJECTION, SOLUTION INTRAVENOUS
Status: DISCONTINUED
Start: 2022-11-14 | End: 2022-11-14 | Stop reason: HOSPADM

## 2022-11-14 RX ORDER — NEOSTIGMINE METHYLSULFATE 1 MG/ML
VIAL (ML) INJECTION PRN
Status: DISCONTINUED | OUTPATIENT
Start: 2022-11-14 | End: 2022-11-14

## 2022-11-14 RX ADMIN — DEXAMETHASONE SODIUM PHOSPHATE 10 MG: 10 INJECTION, SOLUTION INTRAMUSCULAR; INTRAVENOUS at 07:35

## 2022-11-14 RX ADMIN — PHENYLEPHRINE HYDROCHLORIDE 150 MCG: 10 INJECTION INTRAVENOUS at 09:33

## 2022-11-14 RX ADMIN — FENTANYL CITRATE 25 MCG: 50 INJECTION, SOLUTION INTRAMUSCULAR; INTRAVENOUS at 12:14

## 2022-11-14 RX ADMIN — ACETAMINOPHEN 975 MG: 325 TABLET, FILM COATED ORAL at 06:03

## 2022-11-14 RX ADMIN — INDOCYANINE GREEN AND WATER 7.5 MG: KIT at 09:09

## 2022-11-14 RX ADMIN — HYDROMORPHONE HYDROCHLORIDE 0.5 MG: 1 INJECTION, SOLUTION INTRAMUSCULAR; INTRAVENOUS; SUBCUTANEOUS at 10:46

## 2022-11-14 RX ADMIN — GLYCOPYRROLATE 0.8 MG: 0.2 INJECTION, SOLUTION INTRAMUSCULAR; INTRAVENOUS at 09:41

## 2022-11-14 RX ADMIN — SODIUM CHLORIDE, POTASSIUM CHLORIDE, SODIUM LACTATE AND CALCIUM CHLORIDE: 600; 310; 30; 20 INJECTION, SOLUTION INTRAVENOUS at 09:36

## 2022-11-14 RX ADMIN — OXYCODONE HYDROCHLORIDE 5 MG: 5 TABLET ORAL at 13:02

## 2022-11-14 RX ADMIN — TRANEXAMIC ACID 1 G: 1 INJECTION, SOLUTION INTRAVENOUS at 10:26

## 2022-11-14 RX ADMIN — NEOSTIGMINE METHYLSULFATE 4 MG: 1 INJECTION, SOLUTION INTRAVENOUS at 09:41

## 2022-11-14 RX ADMIN — KETAMINE HYDROCHLORIDE 10 MG: 10 INJECTION, SOLUTION INTRAMUSCULAR; INTRAVENOUS at 08:49

## 2022-11-14 RX ADMIN — SODIUM CHLORIDE, POTASSIUM CHLORIDE, SODIUM LACTATE AND CALCIUM CHLORIDE: 600; 310; 30; 20 INJECTION, SOLUTION INTRAVENOUS at 07:19

## 2022-11-14 RX ADMIN — ONDANSETRON 4 MG: 2 INJECTION INTRAMUSCULAR; INTRAVENOUS at 10:19

## 2022-11-14 RX ADMIN — ROCURONIUM BROMIDE 50 MG: 50 INJECTION, SOLUTION INTRAVENOUS at 07:31

## 2022-11-14 RX ADMIN — LIDOCAINE HYDROCHLORIDE 40 MG: 20 INJECTION, SOLUTION INFILTRATION; PERINEURAL at 10:57

## 2022-11-14 RX ADMIN — Medication 2 G: at 07:36

## 2022-11-14 RX ADMIN — MIDAZOLAM 2 MG: 1 INJECTION INTRAMUSCULAR; INTRAVENOUS at 07:23

## 2022-11-14 RX ADMIN — FENTANYL CITRATE 25 MCG: 50 INJECTION, SOLUTION INTRAMUSCULAR; INTRAVENOUS at 12:38

## 2022-11-14 RX ADMIN — PHENYLEPHRINE HYDROCHLORIDE 100 MCG: 10 INJECTION INTRAVENOUS at 09:48

## 2022-11-14 RX ADMIN — MAGNESIUM SULFATE HEPTAHYDRATE 4 G: 80 INJECTION, SOLUTION INTRAVENOUS at 07:07

## 2022-11-14 RX ADMIN — SODIUM CHLORIDE, POTASSIUM CHLORIDE, SODIUM LACTATE AND CALCIUM CHLORIDE: 600; 310; 30; 20 INJECTION, SOLUTION INTRAVENOUS at 06:01

## 2022-11-14 RX ADMIN — TRANEXAMIC ACID 1 G: 1 INJECTION, SOLUTION INTRAVENOUS at 07:47

## 2022-11-14 RX ADMIN — KETAMINE HYDROCHLORIDE 10 MG: 10 INJECTION, SOLUTION INTRAMUSCULAR; INTRAVENOUS at 09:15

## 2022-11-14 RX ADMIN — FENTANYL CITRATE 25 MCG: 50 INJECTION INTRAMUSCULAR; INTRAVENOUS at 11:31

## 2022-11-14 RX ADMIN — SUCCINYLCHOLINE CHLORIDE 140 MG: 20 INJECTION, SOLUTION INTRAMUSCULAR; INTRAVENOUS at 07:31

## 2022-11-14 RX ADMIN — PROPOFOL 170 MG: 10 INJECTION, EMULSION INTRAVENOUS at 07:31

## 2022-11-14 RX ADMIN — LIDOCAINE HYDROCHLORIDE 50 MG: 20 INJECTION, SOLUTION INFILTRATION; PERINEURAL at 07:27

## 2022-11-14 RX ADMIN — GLYCOPYRROLATE 0.2 MG: 0.2 INJECTION, SOLUTION INTRAMUSCULAR; INTRAVENOUS at 07:26

## 2022-11-14 RX ADMIN — PROPOFOL 50 MCG/KG/MIN: 10 INJECTION, EMULSION INTRAVENOUS at 07:35

## 2022-11-14 RX ADMIN — FENTANYL CITRATE 25 MCG: 50 INJECTION, SOLUTION INTRAMUSCULAR; INTRAVENOUS at 09:49

## 2022-11-14 RX ADMIN — HYDROMORPHONE HYDROCHLORIDE 0.5 MG: 1 INJECTION, SOLUTION INTRAMUSCULAR; INTRAVENOUS; SUBCUTANEOUS at 10:56

## 2022-11-14 RX ADMIN — HYDROMORPHONE HYDROCHLORIDE 0.5 MG: 1 INJECTION, SOLUTION INTRAMUSCULAR; INTRAVENOUS; SUBCUTANEOUS at 08:15

## 2022-11-14 RX ADMIN — KETAMINE HYDROCHLORIDE 30 MG: 10 INJECTION, SOLUTION INTRAMUSCULAR; INTRAVENOUS at 07:31

## 2022-11-14 RX ADMIN — LIDOCAINE HYDROCHLORIDE 30 MG: 20 INJECTION, SOLUTION INFILTRATION; PERINEURAL at 07:31

## 2022-11-14 RX ADMIN — FENTANYL CITRATE 25 MCG: 50 INJECTION, SOLUTION INTRAMUSCULAR; INTRAVENOUS at 10:39

## 2022-11-14 RX ADMIN — PROPOFOL 30 MG: 10 INJECTION, EMULSION INTRAVENOUS at 10:39

## 2022-11-14 RX ADMIN — HYDROMORPHONE HYDROCHLORIDE 0.5 MG: 1 INJECTION, SOLUTION INTRAMUSCULAR; INTRAVENOUS; SUBCUTANEOUS at 07:51

## 2022-11-14 RX ADMIN — INDOCYANINE GREEN AND WATER 7.5 MG: KIT at 09:32

## 2022-11-14 RX ADMIN — FENTANYL CITRATE 50 MCG: 50 INJECTION, SOLUTION INTRAMUSCULAR; INTRAVENOUS at 07:31

## 2022-11-14 RX ADMIN — PHENYLEPHRINE HYDROCHLORIDE 150 MCG: 10 INJECTION INTRAVENOUS at 09:29

## 2022-11-14 ASSESSMENT — ACTIVITIES OF DAILY LIVING (ADL)
ADLS_ACUITY_SCORE: 18

## 2022-11-14 NOTE — ANESTHESIA CARE TRANSFER NOTE
Patient: Marcelina Mcginnis    Procedure: Procedure(s):  Bilateral mastectomies  RECONSTRUCTION, BREAST, IMMEDIATE, WITH PERMANENT BREAST IMPLANT, BILATERAL       Diagnosis: BRCA positive [Z15.01, Z15.09]  Diagnosis Additional Information: No value filed.    Anesthesia Type:   General     Note:    Oropharynx: oropharynx clear of all foreign objects and spontaneously breathing  Level of Consciousness: awake and drowsy  Oxygen Supplementation: face mask  Level of Supplemental Oxygen (L/min / FiO2): 6  Independent Airway: airway patency satisfactory and stable  Dentition: dentition unchanged  Vital Signs Stable: post-procedure vital signs reviewed and stable  Report to RN Given: handoff report given  Patient transferred to: PACU    Handoff Report: Identifed the Patient, Identified the Reponsible Provider, Reviewed the pertinent medical history, Discussed the surgical course, Reviewed Intra-OP anesthesia mangement and issues during anesthesia, Set expectations for post-procedure period and Allowed opportunity for questions and acknowledgement of understanding      Vitals:  Vitals Value Taken Time   /76 11/14/22 1113   Temp 36.8  C (98.2  F) 11/14/22 1113   Pulse 76 11/14/22 1113   Resp 16 11/14/22 1113   SpO2 98 % 11/14/22 1113       Electronically Signed By: JETT Martell CRNA  November 14, 2022  11:13 AM

## 2022-11-14 NOTE — ANESTHESIA PROCEDURE NOTES
Airway       Patient location during procedure: OR       Procedure Start/Stop Times: 11/14/2022 7:34 AM  Staff -        Anesthesiologist:  Rosalie Bryan MD       CRNA: Prakash Tejada APRN CRNA       Other Anesthesia Staff: Marcel Byrne RN       Performed By: TATO  Consent for Airway        Urgency: elective  Indications and Patient Condition       Indications for airway management: gerry-procedural       Induction type:intravenous       Mask difficulty assessment: 0 - not attempted    Final Airway Details       Final airway type: endotracheal airway       Successful airway: ETT - single  Endotracheal Airway Details        ETT size (mm): 7.0       Cuffed: yes       Successful intubation technique: direct laryngoscopy       DL Blade Type: Grimes 2       Grade View of Cords: 1       Adjucts: stylet       Position: Right       Measured from: gums/teeth       Secured at (cm): 21       Bite block used: None    Post intubation assessment        Placement verified by: capnometry, equal breath sounds and chest rise        Number of attempts at approach: 1       Number of other approaches attempted: 0       Secured with: silk tape       Ease of procedure: easy       Dentition: Intact and Unchanged       Dental guard used and removed. Dental Guard Type: Proguard Red.    Medication(s) Administered   Medication Administration Time: 11/14/2022 7:34 AM

## 2022-11-14 NOTE — ANESTHESIA POSTPROCEDURE EVALUATION
Patient: Marcelina Mcginnis    Procedure: Procedure(s):  Bilateral mastectomies  RECONSTRUCTION, BREAST, IMMEDIATE, WITH PERMANENT BREAST IMPLANT, BILATERAL       Anesthesia Type:  General    Note:  Disposition: Outpatient   Postop Pain Control: Uneventful            Sign Out: Well controlled pain   PONV: No   Neuro/Psych: Uneventful            Sign Out: Acceptable/Baseline neuro status   Airway/Respiratory: Uneventful            Sign Out: Acceptable/Baseline resp. status   CV/Hemodynamics: Uneventful            Sign Out: Acceptable CV status; No obvious hypovolemia; No obvious fluid overload   Other NRE: NONE   DID A NON-ROUTINE EVENT OCCUR? No    Event details/Postop Comments:  No issues.             Last vitals:  Vitals Value Taken Time   /65 11/14/22 1146   Temp 36.8  C (98.2  F) 11/14/22 1117   Pulse 81 11/14/22 1154   Resp 75 11/14/22 1154   SpO2 98 % 11/14/22 1154   Vitals shown include unvalidated device data.    Electronically Signed By: Rosalie Bryan MD  November 14, 2022  1:35 PM

## 2022-11-14 NOTE — OP NOTE
Operative Note    Name:  Marcelina Mcginnis  PCP:  Nargis Hurley  Procedure Date:  11/14/2022       Procedure:  Procedure(s):  Bilateral mastectomies  RECONSTRUCTION, BREAST, IMMEDIATE, WITH PERMANENT BREAST IMPLANT, BILATERAL     Pre-Procedure Diagnosis:  BRCA positive [Z15.01, Z15.09]     Post-Procedure Diagnosis:    Same     Surgeon(s):  Raymon Avery MD Ogren, Diane S, MD     Assistant: None      Anesthesia Type:  General       Findings:  Normal appearing tissue.    Operative Report:    The patient was properly identified and brought to the operating suite where she was placed in the supine position.  Gen. anesthesia was administered.  The patient was prepped draped in a sterile fashion.    An elliptical shaped incision encompassing the right nipple was made and skin flaps raised superiorly to the pectoralis major, medial to the sternum, inferior to the rectus sheath and laterally to the lateral chest wall.  The breast tissue was swept  from the chest wall using electrocautery.  The wound was inspected for hemostasis.  The wound was packed open with a saline soaked gauze.  Attention was then turned the left where a mirror incision was made and skin flaps raised superior to the pectoralis nucleus, medially to the sternum, inferior to the rectus sheath and lateral to lateral chest wall.  The breast tissue was swept from the chest wall  using electrocautery.    The wound was inspected for hemostasis.  It was then packed open with a saline soaked gauze.  The procedure was turned over to Dr. Tobias for immediate reconstruction.    Estimated Blood Loss:   10cc    Specimens:    ID Type Source Tests Collected by Time Destination   1 : Right Breast stitch lateral Tissue Breast, Right SURGICAL PATHOLOGY EXAM Cass Eaton MD 11/14/2022  8:16 AM    2 : Left breast stitch lateral Tissue Breast, Left SURGICAL PATHOLOGY EXAM Cass Eaton MD 11/14/2022  8:40 AM            Drains:   Urethral Catheter 11/14/22  Latex;Straight-tip 16 fr (Active)       Complications:    None    Cass Eaton MD     Date: 11/14/2022  Time: 8:47 AM

## 2022-11-14 NOTE — INTERVAL H&P NOTE
I have reviewed the surgical (or preoperative) H&P that is linked to this encounter, and examined the patient. There are no significant changes    Clinical Conditions Present on Arrival:  Clinically Significant Risk Factors Present on Admission                          Pt tested postive for Covid on 10/10/2020  He states he is asymptomatic and has followed guideline to have surgery rescheduled    H/p is from 9/22, loretta will follow up with addendum for DOS    PAT interview complete

## 2022-11-14 NOTE — ANESTHESIA PREPROCEDURE EVALUATION
Anesthesia Pre-Procedure Evaluation    Patient: Marcelina Mcginnis   MRN: [id number] : 1975        Procedure : Procedure(s):  Bilateral mastectomies  RECONSTRUCTION, BREAST, IMMEDIATE, WITH PERMANENT BREAST IMPLANT, BILATERAL          Past Medical History:   Diagnosis Date     BRCA positive     needs yearly mammo     Congenital hearing loss     wears bilateral hearing aids     H/O cold sores      Hepatitis B immune     titer drawn and is immune      Past Surgical History:   Procedure Laterality Date     BILATERAL OOPHORECTOMY  2017    due to BRCA positive gene and family history of ovarian cancer      No Known Allergies   Social History     Tobacco Use     Smoking status: Former     Packs/day: 0.50     Years: 15.00     Pack years: 7.50     Types: Cigarettes     Smokeless tobacco: Never   Substance Use Topics     Alcohol use: Yes     Alcohol/week: 2.0 standard drinks     Types: 2 Standard drinks or equivalent per week      Wt Readings from Last 1 Encounters:   22 74.6 kg (164 lb 8 oz)        Anesthesia Evaluation            ROS/MED HX  ENT/Pulmonary:  - neg pulmonary ROS     Neurologic:  - neg neurologic ROS     Cardiovascular:  - neg cardiovascular ROS     METS/Exercise Tolerance:     Hematologic:  - neg hematologic  ROS     Musculoskeletal:  - neg musculoskeletal ROS     GI/Hepatic:  - neg GI/hepatic ROS     Renal/Genitourinary:  - neg Renal ROS     Endo:  - neg endo ROS     Psychiatric/Substance Use:  - neg psychiatric ROS     Infectious Disease:  - neg infectious disease ROS     Malignancy: Comment: brca positive      Other:            Physical Exam    Airway        Mallampati: III   TM distance: > 3 FB   Neck ROM: full   Mouth opening: > 3 cm    Respiratory Devices and Support         Dental       (+) chipped      Cardiovascular   cardiovascular exam normal          Pulmonary   pulmonary exam normal                OUTSIDE LABS:  CBC:   Lab Results   Component Value Date    HGB 14.5 10/17/2022     HGB 15.9 (H) 03/21/2022     BMP: No results found for: NA, POTASSIUM, CHLORIDE, CO2, BUN, CR, GLC  COAGS: No results found for: PTT, INR, FIBR  POC: No results found for: BGM, HCG, HCGS  HEPATIC: No results found for: ALBUMIN, PROTTOTAL, ALT, AST, GGT, ALKPHOS, BILITOTAL, BILIDIRECT, JOHN  OTHER: No results found for: PH, LACT, A1C, GOGO, PHOS, MAG, LIPASE, AMYLASE, TSH, T4, T3, CRP, SED    Anesthesia Plan    ASA Status:  2   NPO Status:  NPO Appropriate    Anesthesia Type: General.     - Airway: ETT   Induction: Intravenous, RSI, Propofol.   Maintenance: Balanced.        Consents    Anesthesia Plan(s) and associated risks, benefits, and realistic alternatives discussed. Questions answered and patient/representative(s) expressed understanding.     - Discussed: Risks, Benefits and Alternatives for BOTH SEDATION and the PROCEDURE were discussed     - Discussed with:  Patient      - Extended Intubation/Ventilatory Support Discussed: No.      - Patient is DNR/DNI Status: No    Use of blood products discussed: No .     Postoperative Care    Pain management: IV analgesics, Multi-modal analgesia.   PONV prophylaxis: Ondansetron (or other 5HT-3), Dexamethasone or Solumedrol     Comments:    Other Comments: GETA    RSI with succ    Ponv ppx            Rosalie Bryan MD

## 2022-11-16 ENCOUNTER — OFFICE VISIT (OUTPATIENT)
Dept: PLASTIC SURGERY | Facility: AMBULATORY SURGERY CENTER | Age: 47
End: 2022-11-16
Payer: COMMERCIAL

## 2022-11-16 VITALS — DIASTOLIC BLOOD PRESSURE: 80 MMHG | SYSTOLIC BLOOD PRESSURE: 124 MMHG

## 2022-11-16 DIAGNOSIS — Z98.890 STATUS POST BILATERAL BREAST RECONSTRUCTION: Primary | ICD-10-CM

## 2022-11-16 PROCEDURE — 99024 POSTOP FOLLOW-UP VISIT: CPT | Performed by: PLASTIC SURGERY

## 2022-11-16 RX ORDER — ONDANSETRON 4 MG/1
TABLET, FILM COATED ORAL EVERY 8 HOURS PRN
COMMUNITY
End: 2023-01-31

## 2022-11-16 RX ORDER — AMOXICILLIN 250 MG
1 CAPSULE ORAL DAILY
COMMUNITY
End: 2023-01-31

## 2022-11-16 RX ORDER — CEPHALEXIN 250 MG/1
250 TABLET ORAL 4 TIMES DAILY
COMMUNITY
End: 2023-01-31

## 2022-11-16 RX ORDER — HYDROCODONE BITARTRATE AND ACETAMINOPHEN 10; 300 MG/1; MG/1
TABLET ORAL
COMMUNITY
End: 2023-01-31

## 2022-11-16 NOTE — LETTER
11/16/2022         RE: Marcelina Mcginnis  [address]  [facility] [address]        Dear Colleague,    Thank you for referring your patient, Marcelina Mcginnis, to the [facility] [place]. Please see a copy of my visit note below.    Marcelina is a 47 years old patient status post bilateral skin sparing mastectomies with immediate prepectoral breast reconstruction.  She is postoperative day #2.  She is doing well.    At the physical exam, Tegaderm dressings are intact.  Incisions are healing well with no sign of infection.  Stan drains with serosanguineous fluid.  There is no evidence of hematoma.  Good symmetry of bilateral implants.    Plan: Continue drain care, may have regular showers and return to see me in 1 week for reevaluation and possible drain removal.  Patient is doing well from plastic surgery standpoint.    Raymon Avery MD , FACS   Diplomate American Board of Plastic Surgery  Diplomate American Board of Surgery  Adj. Assistant Professor of Surgery  Division of Plastic & Reconstructive Surgery   [facility]  Office: [phone]   11/16/2022 at 4:14 PM         Again, thank you for allowing me to participate in the care of your patient.        Sincerely,        Raymon Avery MD

## 2022-11-16 NOTE — PROGRESS NOTES
Marcelina is a 47 years old patient status post bilateral skin sparing mastectomies with immediate prepectoral breast reconstruction.  She is postoperative day #2.  She is doing well.    At the physical exam, Tegaderm dressings are intact.  Incisions are healing well with no sign of infection.  Stan drains with serosanguineous fluid.  There is no evidence of hematoma.  Good symmetry of bilateral implants.    Plan: Continue drain care, may have regular showers and return to see me in 1 week for reevaluation and possible drain removal.  Patient is doing well from plastic surgery standpoint.    Raymon Avery MD , FACS   Diplomate American Board of Plastic Surgery  Diplomate American Board of Surgery  Adj. Assistant Professor of Surgery  Division of Plastic & Reconstructive Surgery   [facility]  Office: [phone]   11/16/2022 at 4:14 PM

## 2022-11-17 NOTE — OP NOTE
Marcelina Mcginnis     November 16, 2022        Preoperative diagnosis:   Positive BRCA1 mutation     Postoperative diagnosis: same     Procedure:      1) Bilateral breast reconstruction with immediate direct to implant prepectoral reconstruction after skin sparing mastectomies.     For the right breast a  265 cc Allergan  full profile implant was utilized.  Reference SCF-265 and SN  [id number]     For the left breast a  265 cc Allergan  full profile implant was also utilized. Reference SCF- 265 and SN  [id number]    2) Bilateral breasts intraoperative perfusion assessment of mastectomy skin flaps using the SPY fluorescence imaging.     Surgeon: Raymon Avery MD     Assistant: Xiomara Birch CSA (there was no resident available)     Anesthesia: General     IV fluids:   1400 cc crystalloids     Urine output:   200 cc     EBL:  15  cc     Findings:   Macroscopically normal-appearing breasts     Specimens: bilateral mastectomies      Drains: two # 15 German Stan drains: 1 on the right breast and 1 on the left breast, for a total of 2 drains.     Disposition: patient tolerated procedure well, she was extubated and transferred to recovery room awake and in stable condition.        Indications:     Mrs. Mcginnis is a 47 years old lady with positive BRCA1 mutation.  She presents with moderate size breasts and grade 1 ptosis.  Her breast skin is also tight.       She has been offered by Dr. Eaton prophylactic bilateral skin sparing mastectomies via periareolar elliptical incision encompassing the nipple areolar complexes.    This will be followed by immediate prepectoral direct to implant breast reconstruction.  I will also interrogate the mastectomy skin flaps with the SPY machine.     Risks were explained to the patient and they include but are not limited to scarring, infection, asymmetry, loss of implant, hematoma, seroma, capsular contracture, tattooing of the nipple areolar complex in the future, complete and  permanent loss of sensation of the skin of the reconstructed breasts, potential need for further surgeries in the future, marital and/or relationship problems.  Patient has acknowledged all these risks and signed informed consent agreeing to proceed.     Procedure:     Patient was identified in the preoperative holding area and preoperative IV antibiotics were given to her.  I then proceeded to draw the elliptical incision encompassing the nipple areolar complexes for her bilateral skin sparing mastectomies.  I also marked the breast meridian, midline, parasternal lines 2 cm lateral from the midline, the anterior axillary line and the inframammary fold.     This was the design:                     Patient was then brought to the operating room and was placed supine in the operating room table. After general anesthesia was obtained the chest and both breasts were prepped and draped in the sterile surgical fashion.     Dr. Eaton began the operation on the right breast and once she completed this mastectomy, I was called to begin the reconstruction.  At that time, she began the mastectomy in the contralateral breast. Please see her operative report for further details.     I first proceeded to re prep this mastectomy site with Betadine, then I inspected the breast boundaries including the parasternal border, the inframammary fold and the anterior axillary line border.  After inspection, I proceeded to reattach the anterior axillary line to the chest wall with multiple 0 Vicryl simple interrupted stitches.     A #15 Wolof Stan drain was introduced in the mastectomy cavity and it was secured to the skin with 2-0 silk.     Irrigation was then provided with antibiotic solution and I found that hemostasis was excellent.     The cavity was packed with antibiotic moist laparotomy pads.     We then moved to the contralateral breast and proceeded to re prep the site with Betadine and check the breast boundaries.  After  inspection of this cavity, I also proceeded to reattach the anterior axillary line to the chest wall with multiple 0 Vicryl simple interrupted stitches.     A #15 Ukrainian Stan drain was also introduced in the mastectomy cavity and it was secured to the skin with 2-0 silk.     Irrigation was then provided with antibiotic solution and I found that hemostasis was excellent.      At this time and after measuring the mastectomy cavities diameter, I brought a 265 cc full profile sizers that were introduced on bilateral mastectomy cavities. The incisions were then closed temporarily with staples and the patient was sat up and I confirm excellent breast shape and symmetry bilaterally.      At this time the ICG dye for the SPY machine was given intravenously by the CRNA and with a hand-held probe I proceeded to interrogate bilateral mastectomy skin flaps with the SPY machine.  The percentage of perfusion was greater than 60% throughout all the mastectomy flaps.  This was excellent because it confirmed appropriate blood supply in order to proceed with direct to implant breast reconstruction, bilaterally.    Therefore, I chose a 265 cc full profile permanent implants.     The patient was then placed back in the horizontal position. Wounds were reopened and sizers were removed.      I then proceeded to apply multiple 3-0 Vicryl pop up interrupted stitches at the level of the fascia on both mastectomy cavities.  These stitches were not tied, the ends of the sutures were secured with hemostats.  They will be later tied after the permanent implants has been introduced in order to prevent any puncturing of the implants.  All needles from the stitches were removed at this time, ensuring that no needle will be on the field at the point of implant insertion.     At this time I irrigated again both cavities with antibiotic solution followed by Betadine and proceeded to prep again with  Betadine the skin of both mastectomy cavities. The  scrub tech, my assistant and I changed to new gloves.     Utilizing a Ayers funnel a  265 cc full profile, cohesive permanent implant was then introduced on both mastectomies cavities in the prepectoral space.  Then, the previously placed 3-0 Vicryl stitches were tied down closing the breast's fascia over the implant.  This ensured no puncturing of the implant because the needles were already previously removed.     The mastectomy wounds were then closed in layers.  Subcutaneous tissue with 4-0 Monocryl running stitches followed by closure of the skin utilizing 5-0 Monocryl running subcuticular stitches.     Prineo was then applied along the closed incisions followed by Tegaderm dressings in orders to create an external cast for the implants. Antibiotic-impregnated disks were applied on the insertion sites of the Stan drains.     Instrument count was reported by nursing personnel as correct.  Patient tolerated procedure well, she was then extubated and transferred to recovery room awake and in stable condition.      Raymon Avery MD , FACS   Diplomate American Board of Plastic Surgery  Diplomate American Board of Surgery  Adj. Assistant Professor of Surgery  Division of Plastic & Reconstructive Surgery   [facility]  Office: [phone]   11/16/2022 at 9:52 PM

## 2022-11-23 ENCOUNTER — OFFICE VISIT (OUTPATIENT)
Dept: PLASTIC SURGERY | Facility: AMBULATORY SURGERY CENTER | Age: 47
End: 2022-11-23
Payer: COMMERCIAL

## 2022-11-23 DIAGNOSIS — Z98.890 STATUS POST BILATERAL BREAST RECONSTRUCTION: Primary | ICD-10-CM

## 2022-11-23 PROCEDURE — 99024 POSTOP FOLLOW-UP VISIT: CPT | Performed by: PLASTIC SURGERY

## 2022-11-23 NOTE — PROGRESS NOTES
Marcelina is a 47 years old patient status post bilateral breast reconstruction.  She is 9 days out from surgery.  Patient is doing well.    At the physical exam, all dressing has been removed.  Mastectomy incisions are healing well with no evidence of infection or wound dehiscence.  Bilateral Stan drains with serosanguineous fluid.  Output still greater than 30 cc.    Plan: Continue to apply antibiotic ointment over the incisions and around drain insertion sites and follow-up with me next week to remove drains.  Patient is doing well from plastic surgery standpoint.    Raymon Avery MD , FACS   Diplomate American Board of Plastic Surgery  Diplomate American Board of Surgery  Adj. Assistant Professor of Surgery  Division of Plastic & Reconstructive Surgery   [facility]  Office: [phone]   11/23/2022 at 11:28 AM

## 2022-11-23 NOTE — NURSING NOTE
The patient is here today for post-op follow-up after surgery on 11/14/2022. The patient is doing quite well and only reports discomfort with the drains.     Hilary Walton RN on 11/23/2022 at 8:52 AM

## 2022-11-23 NOTE — LETTER
11/23/2022         RE: Marcelina Mcginnis  [address]  [facility] [address]        Dear Colleague,    Thank you for referring your patient, Marcelina Mcginnis, to the [facility] [place]. Please see a copy of my visit note below.    Marcelina is a 47 years old patient status post bilateral breast reconstruction.  She is 9 days out from surgery.  Patient is doing well.    At the physical exam, all dressing has been removed.  Mastectomy incisions are healing well with no evidence of infection or wound dehiscence.  Bilateral Stan drains with serosanguineous fluid.  Output still greater than 30 cc.    Plan: Continue to apply antibiotic ointment over the incisions and around drain insertion sites and follow-up with me next week to remove drains.  Patient is doing well from plastic surgery standpoint.    Raymon Avery MD , FACS   Diplomate American Board of Plastic Surgery  Diplomate American Board of Surgery  Adj. Assistant Professor of Surgery  Division of Plastic & Reconstructive Surgery   [facility]  Office: [phone]   11/23/2022 at 11:28 AM         Again, thank you for allowing me to participate in the care of your patient.        Sincerely,        Raymon Avery MD

## 2022-11-25 LAB
PATH REPORT.ADDENDUM SPEC: NORMAL
PATH REPORT.COMMENTS IMP SPEC: NORMAL
PATH REPORT.FINAL DX SPEC: NORMAL
PATH REPORT.GROSS SPEC: NORMAL
PATH REPORT.MICROSCOPIC SPEC OTHER STN: NORMAL
PATH REPORT.RELEVANT HX SPEC: NORMAL
PHOTO IMAGE: NORMAL

## 2022-12-02 ENCOUNTER — OFFICE VISIT (OUTPATIENT)
Dept: PLASTIC SURGERY | Facility: AMBULATORY SURGERY CENTER | Age: 47
End: 2022-12-02
Payer: COMMERCIAL

## 2022-12-02 DIAGNOSIS — Z98.890 STATUS POST BILATERAL BREAST RECONSTRUCTION: Primary | ICD-10-CM

## 2022-12-02 PROCEDURE — 99024 POSTOP FOLLOW-UP VISIT: CPT | Performed by: PLASTIC SURGERY

## 2022-12-02 NOTE — NURSING NOTE
Patient here today for post-op follow up. Surgery was done on 11/14/2022. Patient is doing quite well and drain production has slowed down a bit. Patient is hoping to have them removed today.     The only concern the patient has is that she feels she has a lump or second armpit flap above the right breast that she would like to be looked at.     Hilary Walton RN on 12/2/2022 at 11:38 AM

## 2022-12-02 NOTE — LETTER
[facility] [place] [id number] [facility], [address]  [place] [address]  [phone]          December 2, 2022    RE:  Marcelina Mcginnis                                                                                                                                                       [address] [address]            To whom it may concern:    Marcelina Mcginnis is under my professional care for a surgical procedure. She  may return to work with the following: The employee is ABLE to return to work on 12/19/2022    When the patient returns to work, the following restrictions apply until 01/01/2022:  A) Bend: Occasionally (1-3 hours)  B) Squat: Occasionally (1-3 hours)  C) Walk/Stand: Occasionally (1-3 hours)  D) Reach Above Shoulders: Not at all (0 hours)  E) Lift, carry, push, and pull no more than:  0-10 lbs.Light duty-unable to lift more than 10lbs pounds.      Sincerely,        Raymon Avery MD

## 2022-12-02 NOTE — LETTER
12/2/2022         RE: Marcelina Mcginnis  [address] [address]        Dear Colleague,    Thank you for referring your patient, Marcelina Mcginnis, to the [facility] PLASTIC [facility]GERY [facility]INIC [place]. Please see a copy of my visit note below.    Marcelina is a 38 years old patient status post bilateral breast reconstruction.  She is 18 days out from surgery.  She is doing well    At the physical exam, all incisions are healing well, no sign of infection or dehiscence.  Good symmetry bilaterally with the implants.  Stan drains with output less than 30 cc a day for the last 3 days.  Therefore, bilateral Stan drain has been removed.    Plan: Begin applying cocoa butter lotion along the incisions, give herself massage, begin to wear a sports bra for the next 3 months and follow-up with me in 2 to 3 weeks.  Patient is doing excellent from plastic surgery standpoint.    Raymon Avery MD , FACS   Diplomate American Board of Plastic Surgery  Diplomate American Board of Surgery  Adj. Assistant Professor of Surgery  Division of Plastic & Reconstructive Surgery   [facility]  Office: [phone]   12/2/2022 at 12:05 PM         Again, thank you for allowing me to participate in the care of your patient.        Sincerely,        Raymon Avery MD

## 2022-12-02 NOTE — PROGRESS NOTES
Marcelina is a 38 years old patient status post bilateral breast reconstruction.  She is 18 days out from surgery.  She is doing well    At the physical exam, all incisions are healing well, no sign of infection or dehiscence.  Good symmetry bilaterally with the implants.  Stan drains with output less than 30 cc a day for the last 3 days.  Therefore, bilateral Stan drain has been removed.    Plan: Begin applying cocoa butter lotion along the incisions, give herself massage, begin to wear a sports bra for the next 3 months and follow-up with me in 2 to 3 weeks.  Patient is doing excellent from plastic surgery standpoint.    Raymon Avery MD , FACS   Diplomate American Board of Plastic Surgery  Diplomate American Board of Surgery  Adj. Assistant Professor of Surgery  Division of Plastic & Reconstructive Surgery   [facility]  Office: [phone]   12/2/2022 at 12:05 PM

## 2022-12-16 ENCOUNTER — OFFICE VISIT (OUTPATIENT)
Dept: PLASTIC SURGERY | Facility: AMBULATORY SURGERY CENTER | Age: 47
End: 2022-12-16
Payer: COMMERCIAL

## 2022-12-16 DIAGNOSIS — Z98.890 STATUS POST BILATERAL BREAST RECONSTRUCTION: Primary | ICD-10-CM

## 2022-12-16 PROCEDURE — 99024 POSTOP FOLLOW-UP VISIT: CPT | Performed by: PLASTIC SURGERY

## 2022-12-16 NOTE — PROGRESS NOTES
Marcelina is a 47 years old lady status post direct implant breast reconstruction the prepectoral space.  She underwent bilateral skin sparing mastectomies for BRCA mutation.  Patient is 30 days out from surgery.  She is doing excellent.    At the physical exam, bilateral mastectomy incisions are well-healed.  No evidence of dehiscence or hypertrophic scarring or keloid.  Good symmetry between both implants.  No malposition.  No late seroma.    Plan: Continue with cocoa butter lotion along the scars, may initiate physical activities in 2 weeks, follow-up with me in 1 month.  She is doing well from plastic surgery standpoint.    Raymon Avery MD , FACS   Diplomate American Board of Plastic Surgery  Diplomate American Board of Surgery  Adj. Assistant Professor of Surgery  Division of Plastic & Reconstructive Surgery   [facility]  Office: [phone]   12/16/2022 at 8:58 AM

## 2022-12-16 NOTE — LETTER
12/16/2022         RE: Marcelina Mcginnis  [address] [facility] [address]        Dear Colleague,    Thank you for referring your patient, Marcelina Mcginnis, to the [facility] PLASTIC SURGERY CLINIC [place]. Please see a copy of my visit note below.    Marcelina is a 47 years old lady status post direct implant breast reconstruction the prepectoral space.  She underwent bilateral skin sparing mastectomies for BRCA mutation.  Patient is 30 days out from surgery.  She is doing excellent.    At the physical exam, bilateral mastectomy incisions are well-healed.  No evidence of dehiscence or hypertrophic scarring or keloid.  Good symmetry between both implants.  No malposition.  No late seroma.    Plan: Continue with cocoa butter lotion along the scars, may initiate physical activities in 2 weeks, follow-up with me in 1 month.  She is doing well from plastic surgery standpoint.    Raymon Avery MD , FACS   Diplomate American Board of Plastic Surgery  Diplomate American Board of Surgery  Adj. Assistant Professor of Surgery  Division of Plastic & Reconstructive Surgery   [facility]  Office: [phone]   12/16/2022 at 8:58 AM         Again, thank you for allowing me to participate in the care of your patient.        Sincerely,        Raymon Avery MD

## 2023-01-19 ENCOUNTER — OFFICE VISIT (OUTPATIENT)
Dept: PLASTIC SURGERY | Facility: AMBULATORY SURGERY CENTER | Age: 48
End: 2023-01-19
Payer: COMMERCIAL

## 2023-01-19 DIAGNOSIS — Z98.890 STATUS POST BILATERAL BREAST RECONSTRUCTION: Primary | ICD-10-CM

## 2023-01-19 PROCEDURE — 99024 POSTOP FOLLOW-UP VISIT: CPT | Performed by: PLASTIC SURGERY

## 2023-01-19 NOTE — NURSING NOTE
Patient here today for post-op follow-up. Patient is doing well her only concern is that she feels her implants are too small and that she is having some rippling of the skin in her upper chest when she bends.     Provider to address during visit today.     Hilary Walton RN on 1/19/2023 at 3:48 PM

## 2023-01-19 NOTE — LETTER
1/19/2023         RE: Marcelina Mcginnis  [address] [facility] [address]        Dear Colleague,    Thank you for referring your patient, Marcelina Mcginnis, to the [facility] PLASTIC SURGERY CLINIC [place]. Please see a copy of my visit note below.    Marcelina is a 47 years old lady status post direct implant prepectoral breast reconstruction with 265 cc implant.  She is 2-month out from surgery.    At the physical exam, all incisions are well-healed.  Good symmetry bilaterally.    Plan: Patient is concerned of rippling on both breast.  I have told her that this is secondary to the fact that now that the swelling have subsided the natural rippling of the breast implant can be seen.  I have told her that we can perform fat grafting in top of each implant in the future.  I have told her that I would like to wait at least 6 months from her original surgery to do this procedure.  Patient told me that she is in agreement with this plan.    I will see her again in 3 months for reevaluation and probably we will schedule fat grafting in early summer.    Raymon Avery MD , FACS   Diplomate American Board of Plastic Surgery  Diplomate American Board of Surgery  Adj. Assistant Professor of Surgery  Division of Plastic & Reconstructive Surgery   [facility]  Office: [phone]   1/19/2023 at 4:04 PM         Again, thank you for allowing me to participate in the care of your patient.        Sincerely,        Raymon Avery MD

## 2023-01-19 NOTE — PROGRESS NOTES
Marcelina is a 47 years old lady status post direct implant prepectoral breast reconstruction with 265 cc implant.  She is 2-month out from surgery.    At the physical exam, all incisions are well-healed.  Good symmetry bilaterally.    Plan: Patient is concerned of rippling on both breast.  I have told her that this is secondary to the fact that now that the swelling have subsided the natural rippling of the breast implant can be seen.  I have told her that we can perform fat grafting in top of each implant in the future.  I have told her that I would like to wait at least 6 months from her original surgery to do this procedure.  Patient told me that she is in agreement with this plan.    I will see her again in 3 months for reevaluation and probably we will schedule fat grafting in early summer.    Raymon Avery MD , FACS   Diplomate American Board of Plastic Surgery  Diplomate American Board of Surgery  Adj. Assistant Professor of Surgery  Division of Plastic & Reconstructive Surgery   [facility]  Office: [phone]   1/19/2023 at 4:04 PM

## 2023-01-31 ENCOUNTER — OFFICE VISIT (OUTPATIENT)
Dept: SURGERY | Facility: CLINIC | Age: 48
End: 2023-01-31
Attending: FAMILY MEDICINE
Payer: COMMERCIAL

## 2023-01-31 DIAGNOSIS — Z90.13 STATUS POST BILATERAL MASTECTOMY: Primary | ICD-10-CM

## 2023-01-31 DIAGNOSIS — Z15.09 BRCA POSITIVE: ICD-10-CM

## 2023-01-31 DIAGNOSIS — Z15.01 BRCA POSITIVE: ICD-10-CM

## 2023-01-31 PROCEDURE — 99024 POSTOP FOLLOW-UP VISIT: CPT | Performed by: SPECIALIST

## 2023-01-31 PROCEDURE — 99213 OFFICE O/P EST LOW 20 MIN: CPT | Performed by: SPECIALIST

## 2023-01-31 NOTE — LETTER
1/31/2023         RE: Marcelina Mcginnis  [address] [place] [address]        Dear Colleague,    Thank you for referring your patient, Marcelina Mcginnis, to the [facility] [place]. Please see a copy of my visit note below.    This is a 47 year old woman who comes in for follow-up of her mastectomies done for BRCA mutation.  She is now 3 months  status post bilateral  mastectomies  with immediate reconstruction with implant placement.  She is feeling well.  She just wanted to do a follow-up with me to get my opinion on some reconstruction options.      Please see the chart review for PMH, Meds, allergies, FH and SH.    ROS:  Pertinent items are noted in HPI.      Physical Exam:  There were no vitals taken for this visit.  General appearance: alert, appears stated age and cooperative  Breasts: There are no palpable masses. The scar(s) has(ve) healed up well.  There is some rippling of the implants medially particularly when she leans over.  Overall there is very nice symmetry.  Lymph nodes: Cervical, supraclavicular, and axillary nodes normal.  Neurologic: Grossly normal        Impression: BRCA carrier, status post bilateral prophylactic mastectomies with reconstruction.  Overall I think she looks very good.  She is concerned about the rippling but there is already a plan in place for that.  I told her I thought that was her best option at this point.  I did tell her that with latissimus flaps you could get more coverage of the area but that is a much bigger procedure to go through.  She just wanted to hear that a second time.      Plan: Follow up as needed.  Encouraged her to continue with self breast exam and told her if she ever feels any kind of a lump or bump that I would be very happy to see her back.          Again, thank you for allowing me to participate in the care of your patient.        Sincerely,        Cass Eaton MD

## 2023-01-31 NOTE — NURSING NOTE
Marcelina presents to [facility] for a follow up appointment with Dr. Eaton.Patient notes no new breast concerns. RN assessment and EMRupdate.  Patient met with Dr. Eaton .  See dictation for details of visit and follow up plan.  Support provided, invited calls.  RN time 10 mins.

## 2023-01-31 NOTE — PROGRESS NOTES
This is a 47 year old woman who comes in for follow-up of her mastectomies done for BRCA mutation.  She is now 3 months  status post bilateral  mastectomies  with immediate reconstruction with implant placement.  She is feeling well.  She just wanted to do a follow-up with me to get my opinion on some reconstruction options.      Please see the chart review for PMH, Meds, allergies, FH and SH.    ROS:  Pertinent items are noted in HPI.      Physical Exam:  There were no vitals taken for this visit.  General appearance: alert, appears stated age and cooperative  Breasts: There are no palpable masses. The scar(s) has(ve) healed up well.  There is some rippling of the implants medially particularly when she leans over.  Overall there is very nice symmetry.  Lymph nodes: Cervical, supraclavicular, and axillary nodes normal.  Neurologic: Grossly normal        Impression: BRCA carrier, status post bilateral prophylactic mastectomies with reconstruction.  Overall I think she looks very good.  She is concerned about the rippling but there is already a plan in place for that.  I told her I thought that was her best option at this point.  I did tell her that with latissimus flaps you could get more coverage of the area but that is a much bigger procedure to go through.  She just wanted to hear that a second time.      Plan: Follow up as needed.  Encouraged her to continue with self breast exam and told her if she ever feels any kind of a lump or bump that I would be very happy to see her back.

## 2023-03-27 DIAGNOSIS — R23.2 HOT FLASHES: ICD-10-CM

## 2023-03-29 RX ORDER — NORETHINDRONE ACETATE AND ETHINYL ESTRADIOL .02; 1 MG/1; MG/1
TABLET ORAL
Qty: 84 TABLET | Refills: 2 | Status: SHIPPED | OUTPATIENT
Start: 2023-03-29

## 2023-03-29 NOTE — TELEPHONE ENCOUNTER
"Last Written Prescription Date:  3/21/2022  Last Fill Quantity: 84,  # refills: 4   Last office visit provider:  10/17/2022     Requested Prescriptions   Pending Prescriptions Disp Refills     norethindrone-ethinyl estradiol (JUNEL 1/20) 1-20 MG-MCG tablet [Pharmacy Med Name: JUNEL 1/20 1-20MG-MCG TABS] 84 tablet 4     Sig: TAKE ONE TABLET BY MOUTH EVERY DAY START NEW PACK EVERY 3 WEEK AS DIRECTED       Contraceptives Protocol Passed - 3/27/2023  6:07 PM        Passed - Patient is not a current smoker if age is 35 or older        Passed - Recent (12 mo) or future (30 days) visit within the authorizing provider's specialty     Patient has had an office visit with the authorizing provider or a provider within the authorizing providers department within the previous 12 mos or has a future within next 30 days. See \"Patient Info\" tab in inbasket, or \"Choose Columns\" in Meds & Orders section of the refill encounter.              Passed - Medication is active on med list        Passed - No active pregnancy on record        Passed - No positive pregnancy test in past 12 months             Idalia Combs RN 03/29/23 1:37 AM  "

## 2023-03-30 ENCOUNTER — TELEPHONE (OUTPATIENT)
Dept: FAMILY MEDICINE | Facility: CLINIC | Age: 48
End: 2023-03-30
Payer: COMMERCIAL

## 2023-03-30 NOTE — TELEPHONE ENCOUNTER
I am truly sorry but I am unable to work her in for a preventative visit prior to my departure.  Please express to her that is been a pleasure to take care of her during my time here at the [facility].  She will need to schedule with one of the other providers for a physical exam.

## 2023-03-30 NOTE — TELEPHONE ENCOUNTER
Reason for Call:  Appointment Request    Patient requesting this type of appt:  Preventive     Requested provider: Nargis Hurley    Reason patient unable to be scheduled: Not within requested timeframe    When does patient want to be seen/preferred time: Before Dr. Hurley last day    Comments: Pt is requesting to be fit in for her annual wellness with Dr. Hurley before she leaves clinic. No Appts available.    Could we send this information to you in [employer] or would you prefer to receive a phone call?:   Patient would prefer a phone call   Okay to leave a detailed message?: Yes at Cell number on file:    Telephone Information:   Mobile [phone]   Mobile [phone]       Call taken on 3/30/2023 at 9:47 AM by Stephanie Wall

## 2023-04-06 ENCOUNTER — OFFICE VISIT (OUTPATIENT)
Dept: PLASTIC SURGERY | Facility: AMBULATORY SURGERY CENTER | Age: 48
End: 2023-04-06
Payer: COMMERCIAL

## 2023-04-06 DIAGNOSIS — Z98.890 STATUS POST BILATERAL BREAST RECONSTRUCTION: Primary | ICD-10-CM

## 2023-04-06 PROCEDURE — 99213 OFFICE O/P EST LOW 20 MIN: CPT | Performed by: PLASTIC SURGERY

## 2023-04-06 NOTE — PROGRESS NOTES
Marcelina is a 47 years old lady status post direct implant prepectoral breast reconstruction with permanent implants.  She is almost 5-month out from surgery.  She is doing well.    At the physical exam, patient presents with good symmetry bilaterally, no evidence of capsular contracture or implant malposition.  Patient does present with loss of upper pole fullness bilaterally.    In the abdomen, patient does present with lipodystrophy in the periumbilical and hypogastric area as well as on bilateral flanks.                        Plan: Patient is going to be scheduled for bilateral breast fat grafting.  The donor site will be the abdomen and bilateral flanks and lower back.  I have explained to the patient that approximately 30 to 40% of the fat grafted does not survive and she will need at least 2 fat grafting session, 3 months apart.    Risks were explained to the patient and they include but are not limited to scarring, infection, bleeding, contour irregularities.  Patient agreed to proceed.    We will schedule her surgery for the last week of  June.    Raymon Avery MD , FACS   Diplomate American Board of Plastic Surgery  Diplomate American Board of Surgery  Adj. Assistant Professor of Surgery  Division of Plastic & Reconstructive Surgery   [facility]  Office: [phone]   4/6/2023 at 4:04 PM

## 2023-04-06 NOTE — LETTER
4/6/2023         RE: Marcelina Mcginnis  [address] [address]        Dear Colleague,    Thank you for referring your patient, Marcelina Mcginnis, to the [facility] PLASTIC SURGERY CLINIC [place]. Please see a copy of my visit note below.    Marcelina is a 47 years old lady status post direct implant prepectoral breast reconstruction with permanent implants.  She is almost 5-month out from surgery.  She is doing well.    At the physical exam, patient presents with good symmetry bilaterally, no evidence of capsular contracture or implant malposition.  Patient does present with loss of upper pole fullness bilaterally.    In the abdomen, patient does present with lipodystrophy in the periumbilical and hypogastric area as well as on bilateral flanks.                        Plan: Patient is going to be scheduled for bilateral breast fat grafting.  The donor site will be the abdomen and bilateral flanks and lower back.  I have explained to the patient that approximately 30 to 40% of the fat grafted does not survive and she will need at least 2 fat grafting session, 3 months apart.    Risks were explained to the patient and they include but are not limited to scarring, infection, bleeding, contour irregularities.  Patient agreed to proceed.    We will schedule her surgery for the last week of  June.    Raymon Avery MD , FACS   Diplomate American Board of Plastic Surgery  Diplomate American Board of Surgery  Adj. Assistant Professor of Surgery  Division of Plastic & Reconstructive Surgery   [facility]  Office: [phone]   4/6/2023 at 4:04 PM         Again, thank you for allowing me to participate in the care of your patient.        Sincerely,        Raymon Avery MD

## 2023-04-25 PROBLEM — Z98.890 STATUS POST BILATERAL BREAST RECONSTRUCTION: Status: ACTIVE | Noted: 2023-04-25

## 2023-05-08 ENCOUNTER — ANESTHESIA EVENT (OUTPATIENT)
Dept: SURGERY | Facility: AMBULATORY SURGERY CENTER | Age: 48
End: 2023-05-08
Payer: COMMERCIAL

## 2023-05-08 RX ORDER — OXYCODONE HYDROCHLORIDE 5 MG/1
10 TABLET ORAL
Status: CANCELLED | OUTPATIENT
Start: 2023-05-08

## 2023-05-08 RX ORDER — OXYCODONE HYDROCHLORIDE 5 MG/1
5 TABLET ORAL
Status: CANCELLED | OUTPATIENT
Start: 2023-05-08

## 2023-05-10 ENCOUNTER — PREP FOR PROCEDURE (OUTPATIENT)
Dept: PLASTIC SURGERY | Facility: AMBULATORY SURGERY CENTER | Age: 48
End: 2023-05-10
Payer: COMMERCIAL

## 2023-05-11 ENCOUNTER — ANESTHESIA (OUTPATIENT)
Dept: SURGERY | Facility: AMBULATORY SURGERY CENTER | Age: 48
End: 2023-05-11
Payer: COMMERCIAL

## 2023-05-11 ENCOUNTER — HOSPITAL ENCOUNTER (OUTPATIENT)
Facility: AMBULATORY SURGERY CENTER | Age: 48
Discharge: HOME | End: 2023-05-11
Attending: PLASTIC SURGERY | Admitting: PLASTIC SURGERY
Payer: COMMERCIAL

## 2023-05-11 VITALS
RESPIRATION RATE: 14 BRPM | WEIGHT: 164 LBS | BODY MASS INDEX: 24.29 KG/M2 | OXYGEN SATURATION: 100 % | TEMPERATURE: 98.6 F | HEIGHT: 69 IN | SYSTOLIC BLOOD PRESSURE: 148 MMHG | HEART RATE: 68 BPM | DIASTOLIC BLOOD PRESSURE: 87 MMHG

## 2023-05-11 DIAGNOSIS — Z98.890 STATUS POST BILATERAL BREAST RECONSTRUCTION: Primary | ICD-10-CM

## 2023-05-11 PROCEDURE — 15772 GRFG AUTOL FAT LIPO EA ADDL: CPT | Performed by: PLASTIC SURGERY

## 2023-05-11 PROCEDURE — 15771 GRFG AUTOL FAT LIPO 50 CC/<: CPT

## 2023-05-11 PROCEDURE — 15771 GRFG AUTOL FAT LIPO 50 CC/<: CPT | Performed by: PLASTIC SURGERY

## 2023-05-11 RX ORDER — PROPOFOL 10 MG/ML
INJECTION, EMULSION INTRAVENOUS CONTINUOUS PRN
Status: DISCONTINUED | OUTPATIENT
Start: 2023-05-11 | End: 2023-05-11

## 2023-05-11 RX ORDER — FENTANYL CITRATE 50 UG/ML
INJECTION, SOLUTION INTRAMUSCULAR; INTRAVENOUS PRN
Status: DISCONTINUED | OUTPATIENT
Start: 2023-05-11 | End: 2023-05-11

## 2023-05-11 RX ORDER — FENTANYL CITRATE 50 UG/ML
50 INJECTION, SOLUTION INTRAMUSCULAR; INTRAVENOUS EVERY 5 MIN PRN
Status: DISCONTINUED | OUTPATIENT
Start: 2023-05-11 | End: 2023-05-12 | Stop reason: HOSPADM

## 2023-05-11 RX ORDER — HYDROMORPHONE HYDROCHLORIDE 1 MG/ML
0.2 INJECTION, SOLUTION INTRAMUSCULAR; INTRAVENOUS; SUBCUTANEOUS EVERY 5 MIN PRN
Status: DISCONTINUED | OUTPATIENT
Start: 2023-05-11 | End: 2023-05-12 | Stop reason: HOSPADM

## 2023-05-11 RX ORDER — CEPHALEXIN 500 MG/1
500 CAPSULE ORAL 3 TIMES DAILY
Qty: 21 CAPSULE | Refills: 0 | Status: SHIPPED | OUTPATIENT
Start: 2023-05-11 | End: 2023-05-18

## 2023-05-11 RX ORDER — PROPOFOL 10 MG/ML
INJECTION, EMULSION INTRAVENOUS PRN
Status: DISCONTINUED | OUTPATIENT
Start: 2023-05-11 | End: 2023-05-11

## 2023-05-11 RX ORDER — CEFAZOLIN SODIUM 2 G/50ML
2 SOLUTION INTRAVENOUS
Status: COMPLETED | OUTPATIENT
Start: 2023-05-11 | End: 2023-05-11

## 2023-05-11 RX ORDER — LABETALOL HYDROCHLORIDE 5 MG/ML
10 INJECTION, SOLUTION INTRAVENOUS
Status: DISCONTINUED | OUTPATIENT
Start: 2023-05-11 | End: 2023-05-12 | Stop reason: HOSPADM

## 2023-05-11 RX ORDER — ACETAMINOPHEN 325 MG/1
975 TABLET ORAL ONCE
Status: COMPLETED | OUTPATIENT
Start: 2023-05-11 | End: 2023-05-11

## 2023-05-11 RX ORDER — ONDANSETRON 2 MG/ML
4 INJECTION INTRAMUSCULAR; INTRAVENOUS EVERY 30 MIN PRN
Status: DISCONTINUED | OUTPATIENT
Start: 2023-05-11 | End: 2023-05-12 | Stop reason: HOSPADM

## 2023-05-11 RX ORDER — SODIUM CHLORIDE, SODIUM LACTATE, POTASSIUM CHLORIDE, CALCIUM CHLORIDE 600; 310; 30; 20 MG/100ML; MG/100ML; MG/100ML; MG/100ML
INJECTION, SOLUTION INTRAVENOUS CONTINUOUS
Status: DISCONTINUED | OUTPATIENT
Start: 2023-05-11 | End: 2023-05-12 | Stop reason: HOSPADM

## 2023-05-11 RX ORDER — AMOXICILLIN 250 MG
1-2 CAPSULE ORAL 2 TIMES DAILY
Qty: 30 TABLET | Refills: 0 | Status: SHIPPED | OUTPATIENT
Start: 2023-05-11 | End: 2023-06-08

## 2023-05-11 RX ORDER — ONDANSETRON 4 MG/1
4 TABLET, ORALLY DISINTEGRATING ORAL EVERY 8 HOURS PRN
Qty: 16 TABLET | Refills: 0 | Status: SHIPPED | OUTPATIENT
Start: 2023-05-11 | End: 2023-06-08

## 2023-05-11 RX ORDER — HYDROMORPHONE HYDROCHLORIDE 1 MG/ML
0.4 INJECTION, SOLUTION INTRAMUSCULAR; INTRAVENOUS; SUBCUTANEOUS EVERY 5 MIN PRN
Status: DISCONTINUED | OUTPATIENT
Start: 2023-05-11 | End: 2023-05-12 | Stop reason: HOSPADM

## 2023-05-11 RX ORDER — ONDANSETRON 4 MG/1
4 TABLET, ORALLY DISINTEGRATING ORAL EVERY 30 MIN PRN
Status: DISCONTINUED | OUTPATIENT
Start: 2023-05-11 | End: 2023-05-12 | Stop reason: HOSPADM

## 2023-05-11 RX ORDER — FENTANYL CITRATE 50 UG/ML
25 INJECTION, SOLUTION INTRAMUSCULAR; INTRAVENOUS EVERY 5 MIN PRN
Status: DISCONTINUED | OUTPATIENT
Start: 2023-05-11 | End: 2023-05-12 | Stop reason: HOSPADM

## 2023-05-11 RX ORDER — LIDOCAINE 40 MG/G
CREAM TOPICAL
Status: DISCONTINUED | OUTPATIENT
Start: 2023-05-11 | End: 2023-05-12 | Stop reason: HOSPADM

## 2023-05-11 RX ORDER — DEXAMETHASONE SODIUM PHOSPHATE 4 MG/ML
INJECTION, SOLUTION INTRA-ARTICULAR; INTRALESIONAL; INTRAMUSCULAR; INTRAVENOUS; SOFT TISSUE PRN
Status: DISCONTINUED | OUTPATIENT
Start: 2023-05-11 | End: 2023-05-11

## 2023-05-11 RX ORDER — LIDOCAINE HYDROCHLORIDE 20 MG/ML
INJECTION, SOLUTION INFILTRATION; PERINEURAL PRN
Status: DISCONTINUED | OUTPATIENT
Start: 2023-05-11 | End: 2023-05-11

## 2023-05-11 RX ORDER — HYDROCODONE BITARTRATE AND ACETAMINOPHEN 5; 325 MG/1; MG/1
1-2 TABLET ORAL EVERY 4 HOURS PRN
Qty: 25 TABLET | Refills: 0 | Status: SHIPPED | OUTPATIENT
Start: 2023-05-11 | End: 2023-06-08

## 2023-05-11 RX ADMIN — FENTANYL CITRATE 25 MCG: 50 INJECTION, SOLUTION INTRAMUSCULAR; INTRAVENOUS at 12:36

## 2023-05-11 RX ADMIN — ACETAMINOPHEN 975 MG: 325 TABLET ORAL at 07:31

## 2023-05-11 RX ADMIN — FENTANYL CITRATE 25 MCG: 50 INJECTION, SOLUTION INTRAMUSCULAR; INTRAVENOUS at 09:27

## 2023-05-11 RX ADMIN — PROPOFOL 150 MG: 10 INJECTION, EMULSION INTRAVENOUS at 09:02

## 2023-05-11 RX ADMIN — PROPOFOL 100 MCG/KG/MIN: 10 INJECTION, EMULSION INTRAVENOUS at 11:32

## 2023-05-11 RX ADMIN — HYDROMORPHONE HYDROCHLORIDE 0.2 MG: 1 INJECTION, SOLUTION INTRAMUSCULAR; INTRAVENOUS; SUBCUTANEOUS at 12:56

## 2023-05-11 RX ADMIN — Medication 0.5 MG: at 10:08

## 2023-05-11 RX ADMIN — PROPOFOL 200 MCG/KG/MIN: 10 INJECTION, EMULSION INTRAVENOUS at 09:39

## 2023-05-11 RX ADMIN — FENTANYL CITRATE 25 MCG: 50 INJECTION, SOLUTION INTRAMUSCULAR; INTRAVENOUS at 09:31

## 2023-05-11 RX ADMIN — FENTANYL CITRATE 25 MCG: 50 INJECTION, SOLUTION INTRAMUSCULAR; INTRAVENOUS at 12:29

## 2023-05-11 RX ADMIN — PROPOFOL 200 MCG/KG/MIN: 10 INJECTION, EMULSION INTRAVENOUS at 09:02

## 2023-05-11 RX ADMIN — FENTANYL CITRATE 25 MCG: 50 INJECTION, SOLUTION INTRAMUSCULAR; INTRAVENOUS at 12:46

## 2023-05-11 RX ADMIN — HYDROMORPHONE HYDROCHLORIDE 0.2 MG: 1 INJECTION, SOLUTION INTRAMUSCULAR; INTRAVENOUS; SUBCUTANEOUS at 13:00

## 2023-05-11 RX ADMIN — CEFAZOLIN SODIUM 2 G: 2 SOLUTION INTRAVENOUS at 08:55

## 2023-05-11 RX ADMIN — HYDROMORPHONE HYDROCHLORIDE 0.1 MG: 1 INJECTION, SOLUTION INTRAMUSCULAR; INTRAVENOUS; SUBCUTANEOUS at 13:05

## 2023-05-11 RX ADMIN — DEXAMETHASONE SODIUM PHOSPHATE 4 MG: 4 INJECTION, SOLUTION INTRA-ARTICULAR; INTRALESIONAL; INTRAMUSCULAR; INTRAVENOUS; SOFT TISSUE at 09:09

## 2023-05-11 RX ADMIN — LIDOCAINE HYDROCHLORIDE 70 MG: 20 INJECTION, SOLUTION INFILTRATION; PERINEURAL at 09:00

## 2023-05-11 RX ADMIN — FENTANYL CITRATE 50 MCG: 50 INJECTION, SOLUTION INTRAMUSCULAR; INTRAVENOUS at 09:02

## 2023-05-11 RX ADMIN — SODIUM CHLORIDE, SODIUM LACTATE, POTASSIUM CHLORIDE, CALCIUM CHLORIDE: 600; 310; 30; 20 INJECTION, SOLUTION INTRAVENOUS at 11:51

## 2023-05-11 RX ADMIN — PROPOFOL 125 MCG/KG/MIN: 10 INJECTION, EMULSION INTRAVENOUS at 10:23

## 2023-05-11 RX ADMIN — SODIUM CHLORIDE, SODIUM LACTATE, POTASSIUM CHLORIDE, CALCIUM CHLORIDE: 600; 310; 30; 20 INJECTION, SOLUTION INTRAVENOUS at 07:31

## 2023-05-11 RX ADMIN — FENTANYL CITRATE 25 MCG: 50 INJECTION, SOLUTION INTRAMUSCULAR; INTRAVENOUS at 12:41

## 2023-05-11 ASSESSMENT — LIFESTYLE VARIABLES: TOBACCO_USE: 1

## 2023-05-11 NOTE — DISCHARGE INSTRUCTIONS
[facility]  Home Care Following Anesthesia  For 24 hours after surgery:  Get plenty of rest.  A responsible adult must stay with you for at least 24 hours after you leave the surgery center.  Do not drive or use heavy equipment.  If you have weakness or tingling, don't drive or use heavy equipment until this feeling goes away.   Do not drink alcohol.   Avoid strenuous or risky activities.  Ask for help when climbing stairs.  You may feel lightheaded.  IF so, sit for a few minutes before standing.  Have someone help you get up.   If you have nausea (feel sick to your stomach): Drink only clear liquids such as apple juice, ginger ale, broth or 7-Up.  Rest may also help.  Be sure to drink enough fluids.  Move to a regular diet as you feel able.   You may have a slight fever.  Call the doctor if your fever is over 100 F (37.7 C) (taken under the tongue) or lasts longer than 24 hours.  You may have a dry mouth, a sore throat, muscle aches or trouble sleeping. These should go away after 24 hours.  Do not make important or legal decisions.   It is recommended to avoid smoking.               Tips for taking pain medications  To get the best pain relief possible, remember these points:  Take pain medications as directed, before pain becomes severe.  Pain medication can upset your stomach: taking it with food may help.  Constipation is a common side effect of pain medication. Drink plenty of  fluids.  Eat foods high in fiber. Take a stool softener if recommended by your doctor or pharmacist.  Do not drink alcohol, drive or operate machinery while taking pain medications.  Ask about other ways to control pain, such as with heat, ice or relaxation.    Tylenol/Acetaminophen Consumption  To help encourage the safe use of acetaminophen, the makers of TYLENOL  have lowered the maximum daily dose for single-ingredient Extra Strength TYLENOL  (acetaminophen) products sold in the [place] from 8 pills  per day (4,000 mg) to 6 pills per day (3,000 mg). The dosing interval has also changed from 2 pills every 4-6 hours to 2 pills every 6 hours.  If you feel your pain relief is insufficient, you may take Tylenol/Acetaminophen in addition to your narcotic pain medication.   Be careful not to exceed 3,000 mg of Tylenol/Acetaminophen in a 24 hour period from all sources.  If you are taking extra strength Tylenol/acetaminophen (500 mg), the maximum dose is 6 tablets in 24 hours.  If you are taking regular strength acetaminophen (325 mg), the maximum dose is 9 tablets in 24 hours.    Call a doctor for any of the following:  Signs of infection (fever, growing tenderness at the surgery site, a large amount of drainage or bleeding, severe pain, foul-smelling drainage, redness, swelling).  It has been over 8 to 10 hours since surgery and you are still not able to urinate (pass water).  Headache for over 24 hours.  Numbness, tingling or weakness the day after surgery (if you had spinal anesthesia).  Signs of Covid-19 infection (temperature over 100 degrees, shortness of breath, cough, loss of taste/smell, generalized body aches, persistent headache, chills, sore throat, nausea/vomiting/diarrhea)  Your doctor is:       Dr. Raymon Avery, Plastic Surgery: [phone]               Or dial [phone] and ask for the resident on call for:  Plastics  For emergency care, call the:  [place] [facility]:  [phone] (TTY for hearing impaired: [phone])

## 2023-05-11 NOTE — ANESTHESIA PREPROCEDURE EVALUATION
Anesthesia Pre-Procedure Evaluation    Patient: Marcelina Mcginnis   MRN: [id number] : 1975        Procedure : Procedure(s):  FAT GRAFT, BREAST from abdomen, bilateral flanks and lower back          Past Medical History:   Diagnosis Date     BRCA positive     needs yearly mammo     BRCA1 genetic carrier 3/11/2018     Congenital hearing loss     wears bilateral hearing aids     H/O cold sores      Hepatitis B immune     titer drawn and is immune      Past Surgical History:   Procedure Laterality Date     BILATERAL OOPHORECTOMY  2017    due to BRCA positive gene and family history of ovarian cancer     MASTECTOMY SIMPLE Bilateral 2022    Procedure: Bilateral mastectomies;  Surgeon: Cass Eaton MD;  [place] [facility] [place]     RECONSTRUCT BREAST, IMPLANT PROSTHESIS, COMBINED Bilateral 2022    Procedure: RECONSTRUCTION, BREAST, IMMEDIATE, WITH PERMANENT BREAST IMPLANT, BILATERAL;  Surgeon: Raymon Avery MD;  [place] [facility] [place]      No Known Allergies   Social History     Tobacco Use     Smoking status: Former     Packs/day: 0.50     Years: 15.00     Pack years: 7.50     Types: Cigarettes     Smokeless tobacco: Never   Vaping Use     Vaping status: Never Used   Substance Use Topics     Alcohol use: Yes     Alcohol/week: 2.0 standard drinks of alcohol     Types: 2 Standard drinks or equivalent per week      Wt Readings from Last 1 Encounters:   23 74.4 kg (164 lb)        Anesthesia Evaluation   Pt has had prior anesthetic.     No history of anesthetic complications       ROS/MED HX  ENT/Pulmonary:     (+) tobacco use, Past use,     Neurologic:       Cardiovascular:       METS/Exercise Tolerance:     Hematologic:       Musculoskeletal:       GI/Hepatic:       Renal/Genitourinary:       Endo:       Psychiatric/Substance Use:       Infectious Disease:       Malignancy:   (+) Malignancy, History of Breast.    Other:            Physical Exam    Airway  airway exam normal       Mallampati: I   TM distance: < 3 FB   Neck ROM: full   Mouth opening: > 3 cm    Respiratory Devices and Support         Dental       (+) Completely normal teeth      Cardiovascular   cardiovascular exam normal          Pulmonary   pulmonary exam normal                OUTSIDE LABS:  CBC:   Lab Results   Component Value Date    HGB 14.5 10/17/2022    HGB 15.9 (H) 03/21/2022     BMP: No results found for: NA, POTASSIUM, CHLORIDE, CO2, BUN, CR, GLC  COAGS: No results found for: PTT, INR, FIBR  POC: No results found for: BGM, HCG, HCGS  HEPATIC: No results found for: ALBUMIN, PROTTOTAL, ALT, AST, GGT, ALKPHOS, BILITOTAL, BILIDIRECT, JOHN  OTHER: No results found for: PH, LACT, A1C, GOGO, PHOS, MAG, LIPASE, AMYLASE, TSH, T4, T3, CRP, SED    Anesthesia Plan    ASA Status:  2   NPO Status:  NPO Appropriate    Anesthesia Type: General.     - Airway: LMA   Induction: Intravenous.   Maintenance: Balanced.        Consents    Anesthesia Plan(s) and associated risks, benefits, and realistic alternatives discussed. Questions answered and patient/representative(s) expressed understanding.    - Discussed:     - Discussed with:  Patient      - Extended Intubation/Ventilatory Support Discussed: No.      - Patient is DNR/DNI Status: No    Use of blood products discussed: No .     Postoperative Care    Pain management: IV analgesics, Multi-modal analgesia.   PONV prophylaxis: Ondansetron (or other 5HT-3), Dexamethasone or Solumedrol     Comments:                Hasmukh Up MD

## 2023-05-11 NOTE — ANESTHESIA POSTPROCEDURE EVALUATION
Patient: Marcelina Mcginnis    Procedure: Procedure(s):  FAT GRAFT, BREAST from abdomen, bilateral flanks       Anesthesia Type:  General    Note:  Disposition: Outpatient   Postop Pain Control: Uneventful            Sign Out: Well controlled pain   PONV: No   Neuro/Psych: Uneventful            Sign Out: Acceptable/Baseline neuro status   Airway/Respiratory: Uneventful            Sign Out: Acceptable/Baseline resp. status   CV/Hemodynamics: Uneventful            Sign Out: Acceptable CV status; No obvious hypovolemia; No obvious fluid overload   Other NRE: NONE   DID A NON-ROUTINE EVENT OCCUR? No           Last vitals:  Vitals Value Taken Time   /91 05/11/23 1302   Temp 37.1  C (98.7  F) 05/11/23 1300   Pulse 70 05/11/23 1306   Resp 23 05/11/23 1306   SpO2 98 % 05/11/23 1306   Vitals shown include unvalidated device data.    Electronically Signed By: Hasmukh Up MD  May 11, 2023  2:34 PM

## 2023-05-11 NOTE — ANESTHESIA CARE TRANSFER NOTE
Patient: Marcelina Mcginnis    Procedure: Procedure(s):  FAT GRAFT, BREAST from abdomen, bilateral flanks       Diagnosis: Status post bilateral breast reconstruction [Z98.890]  Diagnosis Additional Information: No value filed.    Anesthesia Type:   General     Note:    Oropharynx: oropharynx clear of all foreign objects  Level of Consciousness: awake  Oxygen Supplementation: room air    Independent Airway: airway patency satisfactory and stable  Dentition: dentition unchanged  Vital Signs Stable: post-procedure vital signs reviewed and stable    Patient transferred to: PACU  Comments: VSS and WNL, comfortable, no PONV, report to Steve RN  Handoff Report: Identifed the Patient, Identified the Reponsible Provider, Reviewed the pertinent medical history, Discussed the surgical course, Reviewed Intra-OP anesthesia mangement and issues during anesthesia, Set expectations for post-procedure period and Allowed opportunity for questions and acknowledgement of understanding      Vitals:  Vitals Value Taken Time   /78 05/11/23 1210   Temp     Pulse 70 05/11/23 1212   Resp 18 05/11/23 1212   SpO2 98 % 05/11/23 1212   Vitals shown include unvalidated device data.    Electronically Signed By: JETT Fontanez CRNA  May 11, 2023  12:14 PM

## 2023-05-11 NOTE — INTERVAL H&P NOTE
I have reviewed the surgical (or preoperative) H&P that is linked to this encounter, and examined the patient. There are no significant changes    Clinical Conditions Present on Arrival:  Clinically Significant Risk Factors Present on Admission                     Raymon Avery MD , FACS   Diplomate American Board of Plastic Surgery  Diplomate American Board of Surgery  Adj. Assistant Professor of Surgery  Division of Plastic & Reconstructive Surgery   [facility]  Office: [phone]   5/11/2023 at 9:01 AM

## 2023-05-12 ENCOUNTER — OFFICE VISIT (OUTPATIENT)
Dept: PLASTIC SURGERY | Facility: AMBULATORY SURGERY CENTER | Age: 48
End: 2023-05-12
Payer: COMMERCIAL

## 2023-05-12 DIAGNOSIS — Z98.890 STATUS POST BILATERAL BREAST RECONSTRUCTION: Primary | ICD-10-CM

## 2023-05-12 PROCEDURE — 99024 POSTOP FOLLOW-UP VISIT: CPT | Performed by: PLASTIC SURGERY

## 2023-05-12 NOTE — OP NOTE
May 11, 2023    Marcelina Mcginnis      Preoperative diagnosis:  Prior history of positive BRCA1 mutation, status post prophylactic bilateral skin sparing mastectomies with immediate direct to implant prepectoral breast reconstruction.     Postoperative diagnosis: same     Procedure: Bilateral reconstructed breast fat grafting: 160 mL of fat injected on the right breast and 120 mL of fat injected on the left breast.      Surgeon: Raymon Avery MD.     Assistant: Xiomara Birch CSA (there was no plastic surgery resident available to assist).     Anesthesia:  General     IV fluids:  1200 ml    Tumescent solution: 1200 mL     Findings:  Bilateral reconstructed breasts with prepectoral, round, full profile silicone implants, 265 cc each.  Loss of bilateral reconstructed breast upper pole fullness.  No evidence of capsular contracture.  No implant malposition      Specimen:  None     Drains:  None     Disposition:  Patient tolerated procedure well, she was extubated and transferred to recovery room awake and in stable condition.     Indications:    Ms. Mcginnis is a 48 years old lady with history of BRCA1 mutation.  She underwent prophylactic bilateral skin sparing mastectomies 6 months ago.  At that time, she also underwent immediate, direct to implant breast reconstruction in the prepectoral space with full profile, 265 cc, Allergan breast implants.    Patient tolerated procedure well.    Patient presents with loss of bilateral reconstructed breast upper pole fullness and also some visualization of breast implants wrinkles.    She has been offered fat grafting in order to improve and provide bilateral upper pole fullness and also to diminish visualization of breast implant wrinkles.    Risk were explained to the patient and they include but are not limited to infection, scarring, contour irregularities, intraperitoneal injuries, puncture of the implants, need for further surgeries.    She has agreed to  proceed.       Procedure:    Patient was identified in the preoperative holding area and preoperative IV antibiotics were given to her.     Then I proceeded to perform topographic markings on the donor site in her anterior abdominal wall and bilateral flanks.  Finally, I also marked the areas on both breasts that would be the recipient of the fat transfer.      These were the markings:                  Patient was then brought to the operating room and was placed supine on the operating room table.  After general anesthesia was obtained, the chest and the abdomen were prepped and draped in sterile surgical fashion.     Then I directed my attention to the abdomen, and the previously marked donor sites in the abdomen were infiltrated with the tumescent solution.  I waited approximately 15 minutes in order to achieve good hemostasis of the abdomen and I made 3 stab incisions in the marked area of the abdomen and with a 5 mm Mercedes-Fortunato tip liposuction cannula I harvested the fat which was collected into the Feedtrace advance adipose system.     Then, I made a stab incisions with a scalpel #15, being very careful, in the lower third of the midline of the sternum as well as at approximately 9 o'clock position at the level of the anterior axillary line on the right breast and approximately 3 o'clock position of the left breast's anterior axillary line.      Utilizing a 2 mm cannula, I proceeded to perform, very carefully, multiple passes in the subcutaneous tissue of both breasts and over the breast implants.  I also proceeded to disrupt the scar tissue underneath the mastectomy scars.     Then I proceeded to perform fat grafting on each breast in the previously developed plane, utilizing the 2 mm cannula.  I injected 160 ml of fat on the right breast in the subcutaneous space, especially in the upper pole area, inframammary fold region, upper and outer pole and in the anterior surface of the breast.  Then I proceeded to  infiltrate 120 mL of fat on the left breast subcutaneous space as well as in the upper pole, inframammary fold area, and anterior surface of the breast.    The opening on each breast was closed with 6-0 Prolene interrupted stitch.  The openings in the abdomen were closed as well with 6-0 Prolene interrupted stitches.     Instrument count was reported by nurse personnel as correct.  A sports bra were applied as well as abdominal binder.  Patient tolerated procedure well, she was extubated and transferred to recovery room awake and in stable condition.    Raymon Avery MD , FACS   Diplomate American Board of Plastic Surgery  Diplomate American Board of Surgery  Adj. Assistant Professor of Surgery  Division of Plastic & Reconstructive Surgery   [facility]  Office: [phone]   5/11/2023 at 8:19 PM

## 2023-05-12 NOTE — LETTER
5/12/2023         RE: Marcelina Mcginnis  [address]  [facility] [address]        Dear Colleague,    Thank you for referring your patient, Marcelina Mcginnis, to the [facility] PLASTIC SURGERY [facility]INIC [place]. Please see a copy of my visit note below.    Marcelina is the 48 years old lady status post bilateral breast fat grafting.  She is postoperative day #1.  No events overnight.  Patient is feeling well.    At the physical exam, abdomen is soft, with no peritonitis.  Both breast with nice contour and volume.  No evidence of hematoma.    Plan: May use a loose sports bra and continue with abdominal binder.  Follow-up with me in 1 week for removal of the stitches.  Patient is doing well from plastic surgery standpoint.    Raymon Avery MD , FACS   Diplomate American Board of Plastic Surgery  Diplomate American Board of Surgery  Adj. Assistant Professor of Surgery  Division of Plastic & Reconstructive Surgery   [facility]  Office: [phone]   5/12/2023 at 1:38 PM         Again, thank you for allowing me to participate in the care of your patient.        Sincerely,        Ryamon Avery MD

## 2023-05-12 NOTE — PROGRESS NOTES
Marcelina is the 48 years old lady status post bilateral breast fat grafting.  She is postoperative day #1.  No events overnight.  Patient is feeling well.    At the physical exam, abdomen is soft, with no peritonitis.  Both breast with nice contour and volume.  No evidence of hematoma.    Plan: May use a loose sports bra and continue with abdominal binder.  Follow-up with me in 1 week for removal of the stitches.  Patient is doing well from plastic surgery standpoint.    Raymon Avery MD , FACS   Diplomate American Board of Plastic Surgery  Diplomate American Board of Surgery  Adj. Assistant Professor of Surgery  Division of Plastic & Reconstructive Surgery   [facility]  Office: [phone]   5/12/2023 at 1:38 PM

## 2023-05-19 ENCOUNTER — OFFICE VISIT (OUTPATIENT)
Dept: PLASTIC SURGERY | Facility: AMBULATORY SURGERY CENTER | Age: 48
End: 2023-05-19
Payer: COMMERCIAL

## 2023-05-19 DIAGNOSIS — Z98.890 STATUS POST BILATERAL BREAST RECONSTRUCTION: Primary | ICD-10-CM

## 2023-05-19 PROCEDURE — 99024 POSTOP FOLLOW-UP VISIT: CPT | Performed by: PLASTIC SURGERY

## 2023-05-19 NOTE — LETTER
5/19/2023         RE: Marcelina Mcginnis  [address]  [facility] [address]        Dear Colleague,    Thank you for referring your patient, Marcelina Mcginnis, to the [facility] PLASTIC SURGERY CLINIC [place]. Please see a copy of my visit note below.    Marcelina is a 48 years old patient status post bilateral fat grafting to bilateral breast.  She is 10 days out from surgery.  Patient is doing well.    At the physical exam, good symmetry on bilateral breast.  Minimal ecchymosis bilaterally.  No evidence of hematoma or seroma.  Stitches from the cannula insertion sites has been removed.    In the abdomen, incisions are well-healed, stitches has been removed.  Minimal ecchymosis in the abdomen as well.    Plan: Continue with compression garment on the abdomen for the next 4 weeks, begin lymphatic drainage massage for the next 4 weeks, continue with sports bra and follow-up with me in 3 weeks.  Patient is doing well from plastic surgery standpoint.    Raymon Avery MD , FACS   Diplomate American Board of Plastic Surgery  Diplomate American Board of Surgery  Adj. Assistant Professor of Surgery  Division of Plastic & Reconstructive Surgery   [facility]  Office: [phone]   5/19/2023 at 11:41 AM             Again, thank you for allowing me to participate in the care of your patient.        Sincerely,        Raymon Avery MD

## 2023-05-19 NOTE — PROGRESS NOTES
Marcelina is a 48 years old patient status post bilateral fat grafting to bilateral breast.  She is 10 days out from surgery.  Patient is doing well.    At the physical exam, good symmetry on bilateral breast.  Minimal ecchymosis bilaterally.  No evidence of hematoma or seroma.  Stitches from the cannula insertion sites has been removed.    In the abdomen, incisions are well-healed, stitches has been removed.  Minimal ecchymosis in the abdomen as well.    Plan: Continue with compression garment on the abdomen for the next 4 weeks, begin lymphatic drainage massage for the next 4 weeks, continue with sports bra and follow-up with me in 3 weeks.  Patient is doing well from plastic surgery standpoint.    Raymon Avery MD , FACS   Diplomate American Board of Plastic Surgery  Diplomate American Board of Surgery  Adj. Assistant Professor of Surgery  Division of Plastic & Reconstructive Surgery   [facility]  Office: [phone]   5/19/2023 at 11:41 AM

## 2023-06-08 ENCOUNTER — OFFICE VISIT (OUTPATIENT)
Dept: PLASTIC SURGERY | Facility: AMBULATORY SURGERY CENTER | Age: 48
End: 2023-06-08
Payer: COMMERCIAL

## 2023-06-08 DIAGNOSIS — Z98.890 STATUS POST BILATERAL BREAST RECONSTRUCTION: Primary | ICD-10-CM

## 2023-06-08 PROCEDURE — 99024 POSTOP FOLLOW-UP VISIT: CPT | Performed by: PLASTIC SURGERY

## 2023-06-08 NOTE — LETTER
6/8/2023         RE: Marcelina Mcginnis  [address]  [facility] [address]        Dear Colleague,    Thank you for referring your patient, Marcelina Mcginnis, to the [facility] PLASTIC SURGERY CLINIC [place]. Please see a copy of my visit note below.    Marcelina is a 48 years old lady status post bilateral breast fat grafting after prophylactic skin sparing mastectomies with direct implant prepectoral breast reconstruction.  She had her first fat grafting 3 weeks ago.  Patient is doing well.    At the physical exam, patient does present with better upper pole fullness.  Good shape and symmetry bilaterally.  Less rippling observed.    Abdomen donor site is healing well, less edema and no more bruising.                        Plan: Continue with sports bra, continue lymphatic drainage massage, follow-up with me in 6 weeks.  Probable second fat grafting in 3 months.    Raymon Avery MD , FACS   Diplomate American Board of Plastic Surgery  Diplomate American Board of Surgery  Adj. Assistant Professor of Surgery  Division of Plastic & Reconstructive Surgery   [facility]  Office: [phone]   6/8/2023 at 3:34 PM         Again, thank you for allowing me to participate in the care of your patient.        Sincerely,        Raymon Avery MD

## 2023-06-08 NOTE — PROGRESS NOTES
Marcelina is a 48 years old lady status post bilateral breast fat grafting after prophylactic skin sparing mastectomies with direct implant prepectoral breast reconstruction.  She had her first fat grafting 3 weeks ago.  Patient is doing well.    At the physical exam, patient does present with better upper pole fullness.  Good shape and symmetry bilaterally.  Less rippling observed.    Abdomen donor site is healing well, less edema and no more bruising.                        Plan: Continue with sports bra, continue lymphatic drainage massage, follow-up with me in 6 weeks.  Probable second fat grafting in 3 months.    Raymon Avery MD , FACS   Diplomate American Board of Plastic Surgery  Diplomate American Board of Surgery  Adj. Assistant Professor of Surgery  Division of Plastic & Reconstructive Surgery   [facility]  Office: [phone]   6/8/2023 at 3:34 PM

## 2023-07-20 ENCOUNTER — OFFICE VISIT (OUTPATIENT)
Dept: PLASTIC SURGERY | Facility: AMBULATORY SURGERY CENTER | Age: 48
End: 2023-07-20
Payer: COMMERCIAL

## 2023-07-20 DIAGNOSIS — Z98.890 STATUS POST BILATERAL BREAST RECONSTRUCTION: Primary | ICD-10-CM

## 2023-07-20 PROCEDURE — 99024 POSTOP FOLLOW-UP VISIT: CPT | Performed by: PLASTIC SURGERY

## 2023-07-20 NOTE — LETTER
7/20/2023         RE: Marcelina Mcginnis  [address]  [facility] [address]        Dear Colleague,    Thank you for referring your patient, Marcelina Mcginnis, to the [facility] PLASTIC SURGERY CLINIC [place]. Please see a copy of my visit note below.    Marcelina is a 48 years old lady status post direct to implant breast reconstruction in the prepectoral space as well as fat grafting.  She is 2-month out from her fat grafting session.  She is doing well.    At the physical exam, patient present with good symmetry and shape.  No evidence of capsular contracture.  Better soft tissue coverage.    Her abdomen is well-healed, with no contour irregularities    Plan: Patient will like to have a second session of fat grafting.  We will plan for September 12 for another fat grafting session.  This time the donor site will be her flanks and back.    Risks remain the same: Contour irregularities, infection, bleeding, hematoma, seroma.  She agreed to proceed.    Raymon Avery MD , FACS   Diplomate American Board of Plastic Surgery  Diplomate American Board of Surgery  Adj. Assistant Professor of Surgery  Division of Plastic & Reconstructive Surgery   [facility]  Office: [phone]   7/20/2023 at 11:16 AM         Again, thank you for allowing me to participate in the care of your patient.        Sincerely,        Raymon Avery MD

## 2023-07-20 NOTE — PROGRESS NOTES
Marcelina is a 48 years old lady status post direct to implant breast reconstruction in the prepectoral space as well as fat grafting.  She is 2-month out from her fat grafting session.  She is doing well.    At the physical exam, patient present with good symmetry and shape.  No evidence of capsular contracture.  Better soft tissue coverage.    Her abdomen is well-healed, with no contour irregularities    Plan: Patient will like to have a second session of fat grafting.  We will plan for September 12 for another fat grafting session.  This time the donor site will be her flanks and back.    Risks remain the same: Contour irregularities, infection, bleeding, hematoma, seroma.  She agreed to proceed.    Raymon Avery MD , FACS   Diplomate American Board of Plastic Surgery  Diplomate American Board of Surgery  Adj. Assistant Professor of Surgery  Division of Plastic & Reconstructive Surgery   [facility]  Office: [phone]   7/20/2023 at 11:16 AM

## 2023-09-11 ENCOUNTER — ANESTHESIA EVENT (OUTPATIENT)
Dept: SURGERY | Facility: AMBULATORY SURGERY CENTER | Age: 48
End: 2023-09-11
Payer: COMMERCIAL

## 2023-09-11 ASSESSMENT — LIFESTYLE VARIABLES: TOBACCO_USE: 1

## 2023-09-11 NOTE — ANESTHESIA PREPROCEDURE EVALUATION
Anesthesia Pre-Procedure Evaluation    Patient: Marcelina Mcginnis   MRN: [id number] : 1975        Procedure : Procedure(s):  fat graft breast,  bilateral breast fat grafting.          Past Medical History:   Diagnosis Date    BRCA positive     needs yearly mammo    BRCA1 genetic carrier 3/11/2018    Congenital hearing loss     wears bilateral hearing aids    H/O cold sores     Hepatitis B immune     titer drawn and is immune      Past Surgical History:   Procedure Laterality Date    BILATERAL OOPHORECTOMY  2017    due to BRCA positive gene and family history of ovarian cancer    GRAFT FAT TO BREAST Bilateral 2023    Procedure: FAT GRAFT, BREAST from abdomen, bilateral flanks;  Surgeon: Raymon Avery MD;  Location: [facility] OR    MASTECTOMY SIMPLE Bilateral 2022    Procedure: Bilateral mastectomies;  Surgeon: Cass Eaton MD;  Location: [facility] OR    RECONSTRUCT BREAST, IMPLANT PROSTHESIS, COMBINED Bilateral 2022    Procedure: RECONSTRUCTION, BREAST, IMMEDIATE, WITH PERMANENT BREAST IMPLANT, BILATERAL;  Surgeon: Raymon Avery MD;  Location: [facility] OR      No Known Allergies   Social History     Tobacco Use    Smoking status: Former     Packs/day: 0.50     Years: 15.00     Pack years: 7.50     Types: Cigarettes    Smokeless tobacco: Never   Substance Use Topics    Alcohol use: Yes     Alcohol/week: 2.0 standard drinks of alcohol     Types: 2 Standard drinks or equivalent per week      Wt Readings from Last 1 Encounters:   23 74.4 kg (164 lb)        Anesthesia Evaluation   Pt has had prior anesthetic.     No history of anesthetic complications       ROS/MED HX  ENT/Pulmonary: Comment: Hearing loss    (+)                tobacco use, Past use,                      Neurologic:       Cardiovascular:       METS/Exercise Tolerance:     Hematologic:       Musculoskeletal:       GI/Hepatic:       Renal/Genitourinary:       Endo:       Psychiatric/Substance Use:        Infectious Disease:       Malignancy:   (+) Malignancy, History of Breast.    Other:            Physical Exam    Airway        Mallampati: II       Respiratory Devices and Support         Dental       (+) Minor Abnormalities - some fillings, tiny chips      Cardiovascular          Rhythm and rate: regular     Pulmonary           breath sounds clear to auscultation           OUTSIDE LABS:  CBC:   Lab Results   Component Value Date    HGB 14.5 10/17/2022    HGB 15.9 (H) 03/21/2022     BMP: No results found for: NA, POTASSIUM, CHLORIDE, CO2, BUN, CR, GLC  COAGS: No results found for: PTT, INR, FIBR  POC: No results found for: BGM, HCG, HCGS  HEPATIC: No results found for: ALBUMIN, PROTTOTAL, ALT, AST, GGT, ALKPHOS, BILITOTAL, BILIDIRECT, JOHN  OTHER: No results found for: PH, LACT, A1C, GOGO, PHOS, MAG, LIPASE, AMYLASE, TSH, T4, T3, CRP, SED    Anesthesia Plan    ASA Status:  2    NPO Status:  NPO Appropriate    Anesthesia Type: General.     - Airway: ETT   Induction: Intravenous.   Maintenance: TIVA.        Consents    Anesthesia Plan(s) and associated risks, benefits, and realistic alternatives discussed. Questions answered and patient/representative(s) expressed understanding.     - Discussed:     - Discussed with:  Patient            Postoperative Care    Pain management: Oral pain medications, IV analgesics, Multi-modal analgesia.   PONV prophylaxis: Ondansetron (or other 5HT-3), Dexamethasone or Solumedrol     Comments:                Pito Capone MD

## 2023-09-12 ENCOUNTER — HOSPITAL ENCOUNTER (OUTPATIENT)
Facility: AMBULATORY SURGERY CENTER | Age: 48
Discharge: HOME | End: 2023-09-12
Attending: PLASTIC SURGERY | Admitting: PLASTIC SURGERY
Payer: COMMERCIAL

## 2023-09-12 ENCOUNTER — ANESTHESIA (OUTPATIENT)
Dept: SURGERY | Facility: AMBULATORY SURGERY CENTER | Age: 48
End: 2023-09-12
Payer: COMMERCIAL

## 2023-09-12 VITALS
WEIGHT: 163 LBS | TEMPERATURE: 98.2 F | BODY MASS INDEX: 24.14 KG/M2 | RESPIRATION RATE: 16 BRPM | HEIGHT: 69 IN | SYSTOLIC BLOOD PRESSURE: 138 MMHG | HEART RATE: 60 BPM | DIASTOLIC BLOOD PRESSURE: 81 MMHG | OXYGEN SATURATION: 100 %

## 2023-09-12 DIAGNOSIS — Z98.890 STATUS POST BILATERAL BREAST RECONSTRUCTION: Primary | ICD-10-CM

## 2023-09-12 LAB
HCG UR QL: NEGATIVE
INTERNAL QC OK POCT: NORMAL
POCT KIT EXPIRATION DATE: NORMAL
POCT KIT LOT NUMBER: NORMAL

## 2023-09-12 PROCEDURE — 15771 GRFG AUTOL FAT LIPO 50 CC/<: CPT

## 2023-09-12 PROCEDURE — 15771 GRFG AUTOL FAT LIPO 50 CC/<: CPT | Performed by: PLASTIC SURGERY

## 2023-09-12 PROCEDURE — 15772 GRFG AUTOL FAT LIPO EA ADDL: CPT | Performed by: PLASTIC SURGERY

## 2023-09-12 PROCEDURE — 81025 URINE PREGNANCY TEST: CPT | Performed by: PATHOLOGY

## 2023-09-12 RX ORDER — PROPOFOL 10 MG/ML
INJECTION, EMULSION INTRAVENOUS CONTINUOUS PRN
Status: DISCONTINUED | OUTPATIENT
Start: 2023-09-12 | End: 2023-09-12

## 2023-09-12 RX ORDER — PROPOFOL 10 MG/ML
INJECTION, EMULSION INTRAVENOUS PRN
Status: DISCONTINUED | OUTPATIENT
Start: 2023-09-12 | End: 2023-09-12

## 2023-09-12 RX ORDER — EPINEPHRINE 1 MG/ML
INJECTION, SOLUTION, CONCENTRATE INTRAVENOUS PRN
Status: DISCONTINUED | OUTPATIENT
Start: 2023-09-12 | End: 2023-09-12 | Stop reason: HOSPADM

## 2023-09-12 RX ORDER — ONDANSETRON 2 MG/ML
4 INJECTION INTRAMUSCULAR; INTRAVENOUS EVERY 30 MIN PRN
Status: DISCONTINUED | OUTPATIENT
Start: 2023-09-12 | End: 2023-09-13 | Stop reason: HOSPADM

## 2023-09-12 RX ORDER — ACETAMINOPHEN 325 MG/1
975 TABLET ORAL ONCE
Status: COMPLETED | OUTPATIENT
Start: 2023-09-12 | End: 2023-09-12

## 2023-09-12 RX ORDER — SODIUM CHLORIDE, SODIUM LACTATE, POTASSIUM CHLORIDE, AND CALCIUM CHLORIDE .6; .31; .03; .02 G/100ML; G/100ML; G/100ML; G/100ML
IRRIGANT IRRIGATION PRN
Status: DISCONTINUED | OUTPATIENT
Start: 2023-09-12 | End: 2023-09-12 | Stop reason: HOSPADM

## 2023-09-12 RX ORDER — KETOROLAC TROMETHAMINE 30 MG/ML
30 INJECTION, SOLUTION INTRAMUSCULAR; INTRAVENOUS ONCE
Status: COMPLETED | OUTPATIENT
Start: 2023-09-12 | End: 2023-09-12

## 2023-09-12 RX ORDER — HYDROMORPHONE HYDROCHLORIDE 1 MG/ML
0.2 INJECTION, SOLUTION INTRAMUSCULAR; INTRAVENOUS; SUBCUTANEOUS EVERY 5 MIN PRN
Status: DISCONTINUED | OUTPATIENT
Start: 2023-09-12 | End: 2023-09-13 | Stop reason: HOSPADM

## 2023-09-12 RX ORDER — ONDANSETRON 2 MG/ML
INJECTION INTRAMUSCULAR; INTRAVENOUS PRN
Status: DISCONTINUED | OUTPATIENT
Start: 2023-09-12 | End: 2023-09-12

## 2023-09-12 RX ORDER — LIDOCAINE 40 MG/G
CREAM TOPICAL
Status: DISCONTINUED | OUTPATIENT
Start: 2023-09-12 | End: 2023-09-12 | Stop reason: HOSPADM

## 2023-09-12 RX ORDER — FENTANYL CITRATE 50 UG/ML
25 INJECTION, SOLUTION INTRAMUSCULAR; INTRAVENOUS
Status: DISCONTINUED | OUTPATIENT
Start: 2023-09-12 | End: 2023-09-13 | Stop reason: HOSPADM

## 2023-09-12 RX ORDER — OXYCODONE HYDROCHLORIDE 5 MG/1
5 TABLET ORAL
Status: COMPLETED | OUTPATIENT
Start: 2023-09-12 | End: 2023-09-12

## 2023-09-12 RX ORDER — HYDROMORPHONE HYDROCHLORIDE 1 MG/ML
0.4 INJECTION, SOLUTION INTRAMUSCULAR; INTRAVENOUS; SUBCUTANEOUS EVERY 5 MIN PRN
Status: DISCONTINUED | OUTPATIENT
Start: 2023-09-12 | End: 2023-09-13 | Stop reason: HOSPADM

## 2023-09-12 RX ORDER — SODIUM CHLORIDE, SODIUM LACTATE, POTASSIUM CHLORIDE, CALCIUM CHLORIDE 600; 310; 30; 20 MG/100ML; MG/100ML; MG/100ML; MG/100ML
INJECTION, SOLUTION INTRAVENOUS CONTINUOUS PRN
Status: DISCONTINUED | OUTPATIENT
Start: 2023-09-12 | End: 2023-09-12

## 2023-09-12 RX ORDER — GLYCOPYRROLATE 0.2 MG/ML
INJECTION, SOLUTION INTRAMUSCULAR; INTRAVENOUS PRN
Status: DISCONTINUED | OUTPATIENT
Start: 2023-09-12 | End: 2023-09-12

## 2023-09-12 RX ORDER — LIDOCAINE HYDROCHLORIDE 20 MG/ML
INJECTION, SOLUTION INFILTRATION; PERINEURAL PRN
Status: DISCONTINUED | OUTPATIENT
Start: 2023-09-12 | End: 2023-09-12

## 2023-09-12 RX ORDER — DEXAMETHASONE SODIUM PHOSPHATE 4 MG/ML
INJECTION, SOLUTION INTRA-ARTICULAR; INTRALESIONAL; INTRAMUSCULAR; INTRAVENOUS; SOFT TISSUE PRN
Status: DISCONTINUED | OUTPATIENT
Start: 2023-09-12 | End: 2023-09-12

## 2023-09-12 RX ORDER — AMOXICILLIN 250 MG
1-2 CAPSULE ORAL 2 TIMES DAILY
Qty: 30 TABLET | Refills: 0 | Status: SHIPPED | OUTPATIENT
Start: 2023-09-12

## 2023-09-12 RX ORDER — CEPHALEXIN 500 MG/1
500 CAPSULE ORAL 3 TIMES DAILY
Qty: 9 CAPSULE | Refills: 0 | Status: SHIPPED | OUTPATIENT
Start: 2023-09-12 | End: 2023-09-15

## 2023-09-12 RX ORDER — FENTANYL CITRATE 50 UG/ML
50 INJECTION, SOLUTION INTRAMUSCULAR; INTRAVENOUS EVERY 5 MIN PRN
Status: DISCONTINUED | OUTPATIENT
Start: 2023-09-12 | End: 2023-09-13 | Stop reason: HOSPADM

## 2023-09-12 RX ORDER — CEFAZOLIN SODIUM 2 G/50ML
2 SOLUTION INTRAVENOUS
Status: COMPLETED | OUTPATIENT
Start: 2023-09-12 | End: 2023-09-12

## 2023-09-12 RX ORDER — ONDANSETRON 4 MG/1
4 TABLET, ORALLY DISINTEGRATING ORAL EVERY 30 MIN PRN
Status: DISCONTINUED | OUTPATIENT
Start: 2023-09-12 | End: 2023-09-13 | Stop reason: HOSPADM

## 2023-09-12 RX ORDER — OXYCODONE HYDROCHLORIDE 5 MG/1
10 TABLET ORAL
Status: DISCONTINUED | OUTPATIENT
Start: 2023-09-12 | End: 2023-09-13 | Stop reason: HOSPADM

## 2023-09-12 RX ORDER — HYDROCODONE BITARTRATE AND ACETAMINOPHEN 5; 325 MG/1; MG/1
1-2 TABLET ORAL EVERY 4 HOURS PRN
Qty: 25 TABLET | Refills: 0 | Status: SHIPPED | OUTPATIENT
Start: 2023-09-12

## 2023-09-12 RX ORDER — ONDANSETRON 4 MG/1
4 TABLET, ORALLY DISINTEGRATING ORAL EVERY 6 HOURS PRN
Qty: 14 TABLET | Refills: 0 | Status: SHIPPED | OUTPATIENT
Start: 2023-09-12

## 2023-09-12 RX ORDER — SODIUM CHLORIDE, SODIUM LACTATE, POTASSIUM CHLORIDE, CALCIUM CHLORIDE 600; 310; 30; 20 MG/100ML; MG/100ML; MG/100ML; MG/100ML
INJECTION, SOLUTION INTRAVENOUS CONTINUOUS
Status: DISCONTINUED | OUTPATIENT
Start: 2023-09-12 | End: 2023-09-13 | Stop reason: HOSPADM

## 2023-09-12 RX ORDER — FENTANYL CITRATE 50 UG/ML
INJECTION, SOLUTION INTRAMUSCULAR; INTRAVENOUS PRN
Status: DISCONTINUED | OUTPATIENT
Start: 2023-09-12 | End: 2023-09-12

## 2023-09-12 RX ORDER — SODIUM CHLORIDE, SODIUM LACTATE, POTASSIUM CHLORIDE, CALCIUM CHLORIDE 600; 310; 30; 20 MG/100ML; MG/100ML; MG/100ML; MG/100ML
INJECTION, SOLUTION INTRAVENOUS CONTINUOUS
Status: DISCONTINUED | OUTPATIENT
Start: 2023-09-12 | End: 2023-09-12 | Stop reason: HOSPADM

## 2023-09-12 RX ORDER — FENTANYL CITRATE 50 UG/ML
25 INJECTION, SOLUTION INTRAMUSCULAR; INTRAVENOUS EVERY 5 MIN PRN
Status: DISCONTINUED | OUTPATIENT
Start: 2023-09-12 | End: 2023-09-13 | Stop reason: HOSPADM

## 2023-09-12 RX ORDER — GABAPENTIN 300 MG/1
300 CAPSULE ORAL
Status: COMPLETED | OUTPATIENT
Start: 2023-09-12 | End: 2023-09-12

## 2023-09-12 RX ORDER — KETAMINE HYDROCHLORIDE 10 MG/ML
INJECTION INTRAMUSCULAR; INTRAVENOUS PRN
Status: DISCONTINUED | OUTPATIENT
Start: 2023-09-12 | End: 2023-09-12

## 2023-09-12 RX ADMIN — CEFAZOLIN SODIUM 2 G: 2 SOLUTION INTRAVENOUS at 08:00

## 2023-09-12 RX ADMIN — KETOROLAC TROMETHAMINE 30 MG: 30 INJECTION, SOLUTION INTRAMUSCULAR; INTRAVENOUS at 12:22

## 2023-09-12 RX ADMIN — ACETAMINOPHEN 975 MG: 325 TABLET ORAL at 07:01

## 2023-09-12 RX ADMIN — FENTANYL CITRATE 75 MCG: 50 INJECTION, SOLUTION INTRAMUSCULAR; INTRAVENOUS at 08:06

## 2023-09-12 RX ADMIN — PROPOFOL 40 MG: 10 INJECTION, EMULSION INTRAVENOUS at 10:37

## 2023-09-12 RX ADMIN — GLYCOPYRROLATE 0.2 MG: 0.2 INJECTION, SOLUTION INTRAMUSCULAR; INTRAVENOUS at 08:24

## 2023-09-12 RX ADMIN — PROPOFOL 150 MCG/KG/MIN: 10 INJECTION, EMULSION INTRAVENOUS at 08:06

## 2023-09-12 RX ADMIN — FENTANYL CITRATE 25 MCG: 50 INJECTION, SOLUTION INTRAMUSCULAR; INTRAVENOUS at 11:04

## 2023-09-12 RX ADMIN — DEXAMETHASONE SODIUM PHOSPHATE 4 MG: 4 INJECTION, SOLUTION INTRA-ARTICULAR; INTRALESIONAL; INTRAMUSCULAR; INTRAVENOUS; SOFT TISSUE at 08:24

## 2023-09-12 RX ADMIN — Medication 100 MCG: at 09:26

## 2023-09-12 RX ADMIN — SODIUM CHLORIDE, SODIUM LACTATE, POTASSIUM CHLORIDE, CALCIUM CHLORIDE: 600; 310; 30; 20 INJECTION, SOLUTION INTRAVENOUS at 08:02

## 2023-09-12 RX ADMIN — PROPOFOL 200 MG: 10 INJECTION, EMULSION INTRAVENOUS at 08:06

## 2023-09-12 RX ADMIN — ONDANSETRON 4 MG: 2 INJECTION INTRAMUSCULAR; INTRAVENOUS at 10:30

## 2023-09-12 RX ADMIN — Medication 50 MG: at 08:07

## 2023-09-12 RX ADMIN — KETAMINE HYDROCHLORIDE 30 MG: 10 INJECTION INTRAMUSCULAR; INTRAVENOUS at 08:40

## 2023-09-12 RX ADMIN — SODIUM CHLORIDE, SODIUM LACTATE, POTASSIUM CHLORIDE, CALCIUM CHLORIDE: 600; 310; 30; 20 INJECTION, SOLUTION INTRAVENOUS at 07:08

## 2023-09-12 RX ADMIN — GABAPENTIN 300 MG: 300 CAPSULE ORAL at 07:01

## 2023-09-12 RX ADMIN — KETAMINE HYDROCHLORIDE 10 MG: 10 INJECTION INTRAMUSCULAR; INTRAVENOUS at 09:42

## 2023-09-12 RX ADMIN — OXYCODONE HYDROCHLORIDE 5 MG: 5 TABLET ORAL at 10:57

## 2023-09-12 RX ADMIN — LIDOCAINE HYDROCHLORIDE 80 MG: 20 INJECTION, SOLUTION INFILTRATION; PERINEURAL at 08:06

## 2023-09-12 RX ADMIN — Medication 0.5 MG: at 09:10

## 2023-09-12 RX ADMIN — FENTANYL CITRATE 25 MCG: 50 INJECTION, SOLUTION INTRAMUSCULAR; INTRAVENOUS at 08:35

## 2023-09-12 NOTE — ANESTHESIA POSTPROCEDURE EVALUATION
Patient: Marcelina Mcginnis    Procedure: Procedure(s):  fat graft breast,  bilateral breast fat grafting.       Anesthesia Type:  General    Note:  Disposition: Outpatient   Postop Pain Control: Uneventful            Sign Out: Well controlled pain   PONV: No   Neuro/Psych: Uneventful            Sign Out: Acceptable/Baseline neuro status   Airway/Respiratory: Uneventful            Sign Out: Acceptable/Baseline resp. status   CV/Hemodynamics: Uneventful            Sign Out: Acceptable CV status; No obvious hypovolemia; No obvious fluid overload   Other NRE: NONE   DID A NON-ROUTINE EVENT OCCUR?            Last vitals:  Vitals Value Taken Time   /74 09/12/23 1100   Temp 36.4  C (97.6  F) 09/12/23 1051   Pulse 61 09/12/23 1115   Resp 15 09/12/23 1115   SpO2 98 % 09/12/23 1112   Vitals shown include unvalidated device data.    Electronically Signed By: Pito Capone MD  September 12, 2023  11:16 AM

## 2023-09-12 NOTE — H&P
Chief complaint:  Loss of upper pole fullness bilateral reconstructed breasts    History of present illness:  This is a 48 year old  lady who presents for fat grafting of her bilateral reconstructed breasts. She has history of implant-based breast reconstruction and previous fat grafting.    Past medical history:  Past Medical History:   Diagnosis Date    BRCA positive     needs yearly mammo    BRCA1 genetic carrier 3/11/2018    Congenital hearing loss     wears bilateral hearing aids    H/O cold sores     Hepatitis B immune     titer drawn and is immune       Past surgical history:  Bilateral skin sparing mastectomies, bilateral implant-based reconstruction, bilateral breast fat grafting.    Allergies:  NKDA    Medications:    Current Outpatient Medications:     norethindrone-ethinyl estradiol (JUNEL 1/20) 1-20 MG-MCG tablet, TAKE ONE TABLET BY MOUTH EVERY DAY START NEW PACK EVERY 3 WEEK AS DIRECTED, Disp: 84 tablet, Rfl: 2    valACYclovir (VALTREX) 1000 mg tablet, Take 2 tablets (2,000 mg) by mouth 2 times daily for 1 day, Disp: 4 tablet, Rfl: 3    Current Facility-Administered Medications:     ceFAZolin (ANCEF) intermittent infusion 2 g in 50 mL dextrose PREMIX, 2 g, Intravenous, Pre-Op/Pre-procedure x 1 dose, Raymon Avery MD    lactated ringers infusion, , Intravenous, Continuous, Pito Capone MD, Last Rate: 10 mL/hr at 09/12/23 0708, New Bag at 09/12/23 0708    lidocaine (LMX4) kit, , Topical, Q1H PRN, Pito Capone MD    lidocaine 1 % 0.1-1 mL, 0.1-1 mL, Other, Q1H PRN, Pito Capone MD    sodium chloride (PF) 0.9% PF flush 3 mL, 3 mL, Intracatheter, Q8H, Pito Capone MD    sodium chloride (PF) 0.9% PF flush 3 mL, 3 mL, Intracatheter, q1 min prn, Pito Capone MD    Family history:  Non contributory    Social History:  Non contributory    Review of systems:  General ROS: No complaints or constitutional symptoms  Skin: No complaints or symptoms  "  Hematologic/Lymphatic: No symptoms or complaints  Psychiatric: No symptoms or complaints  Endocrine: No excessive fatigue, no hypermetabolic symptoms reported  Respiratory ROS: No cough, shortness of breath, or wheezing  Cardiovascular ROS: No chest pain or dyspnea on exertion  Breast ROS: Denies palpable breast masses, denies nipple discharge, denies peau d'orange  Gastrointestinal ROS: No abdominal pain, nausea, diarrhea, or constipation  Musculoskeletal ROS: No recent injuries reported  Neurological ROS: No focal neurologic defects reported.      Physical exam:  /86 (BP Location: Right arm)   Pulse 62   Temp 97  F (36.1  C) (Temporal)   Resp 18   Ht 1.753 m (5' 9\")   Wt 73.9 kg (163 lb)   SpO2 99%   BMI 24.07 kg/m    General: Alert, cooperative, appears stated age   Skin: Skin color, texture, turgor normal, no rashes or lesions   Lymphatic: No obvious adenopathy, no swelling   Eyes: No scleral icterus, pupils equal  HENT: No traumatic injury to the head or face, no gross abnormalities  Lungs: Normal respiratory effort, breath sounds equal bilaterally  Heart: Regular rate and rhythm  Breasts: Bilateral reconstructed breast with loss of upper pole fullness  Abdomen: Soft, non-distended and non-tender to palpation  Neurologic: Grossly intact      ASSESSMENT:    This is a 48 year old lady with history of bilateral implant based reconstruction and loss of upper pole fullness.       PLAN:      Bilateral breast fat grafting. Harvesting will come from her back and lumbar regions.    Risks include but are not limited to are scarring, infection, bleeding, contour irregularities, implant rupture, need for further surgeries. Patient agreed to proceed.      Raymon Avery MD, FACS   Diplomate American Board of Plastic Surgery  Diplomate American Board of Surgery  [facility]  Division of Plastic & Reconstructive Surgery  Office: [phone]   9/12/2023 at 7:34 AM    "

## 2023-09-12 NOTE — OP NOTE
2023    Marcelina Mcginnis        Preoperative diagnosis:  Prior history of positive BRCA1 mutation, status post prophylactic bilateral skin sparing mastectomies with immediate direct to implant prepectoral breast reconstruction.     Postoperative diagnosis: same     Procedure: Bilateral reconstructed breast fat graftin mL of fat injected on the right breast and 140 mL of fat injected on the left breast.      Surgeon: Raymon Avery MD.     Assistant: Xiomara Birch CSA (there was no plastic surgery resident available to assist).     Anesthesia:  General     IV fluids:  850 ml     Tumescent solution: 1000 mL     Findings:  Bilateral reconstructed breasts with prepectoral, round, full profile silicone implants, 265 cc each.  Loss of bilateral reconstructed breast upper pole fullness.  No evidence of capsular contracture.  No implant malposition      Specimen:  None     Drains:  None     Disposition:  Patient tolerated procedure well, she was extubated and transferred to recovery room awake and in stable condition.     Indications:     Ms. Mcginnis is a 48 years old lady with history of BRCA1 mutation.  She underwent prophylactic bilateral skin sparing mastectomies 2022.  At that time, she also underwent immediate, direct to implant breast reconstruction in the prepectoral space with full profile, 265 cc, Allergan breast implants.     Patient tolerated procedure well.     Patient presents with loss of bilateral reconstructed breast upper pole fullness and also some visualization of breast implants wrinkles.     On May 2023 she underwent first session of fat grafting on bilateral upper poles.  At that time the donor site was her abdomen and bilateral flanks.    She returns for her second session of fat grafting on bilateral upper poles.     Risk were explained to the patient and they include but are not limited to infection, scarring, contour irregularities, intraperitoneal injuries,  puncture of the implants, need for further surgeries.     She has agreed to proceed.        Procedure:     Patient was identified in the preoperative holding area and preoperative IV antibiotics were given to her.     Then I proceeded to perform topographic markings on the donor site on bilateral lumbar regions and potentially, on her posterior upper chest, bilateral.  Finally, I also marked the areas on both breasts that would be the recipient of the fat transfer.       These were the markings:             Patient was then brought to the operating room and was placed supine on the operating room table.  After general anesthesia was obtained, the patient was then placed in a prone position in the operating room table and her back was prepped and draped in sterile surgical fashion.     Then I directed my attention to bilateral lumbar regions, and the previously marked donor sites were infiltrated with the tumescent solution.  I waited approximately 15 minutes in order to achieve good hemostasis.  I then made 2 stab incisions in the midline of the lower back and with a 4 mm and 3 mm Mercedes-Fortunato tip liposuction cannula I harvested the fat from bilateral lumbar region which was then collected into the Everfi advance adipose system.  The openings were closed with 5-0 Prolene interrupted stitches.    At this time, the patient was placed back on the supine position and her chest was prepped and draped in a sterile surgical fashion.     Then, I made a stab incisions with a scalpel #15, being very careful, in the upper third of the midline of the sternum as well as at upper third of bilateral mastectomy scars.     Utilizing a 2 mm cannula, I proceeded to perform, very carefully, multiple passes in the subcutaneous tissue of both breasts and over the breast implants.  I also proceeded to disrupt the scar tissue underneath the mastectomy scars.     Then I proceeded to perform fat grafting on each breast in the previously  developed plane, utilizing the 2 mm cannula.  I injected 120 ml of fat on the right breast in the subcutaneous space, especially in the upper pole area, inframammary fold region, upper and outer pole and in the anterior surface of the breast.  Then I proceeded to infiltrate 140 mL of fat on the left breast subcutaneous space as well as in the upper pole, inframammary fold area, and anterior surface of the breast.     The opening on each breast was closed with 5-0 Prolene interrupted stitch.     Instrument count was reported by nurse personnel as correct.  A sports bra were applied as well as abdominal binder.  Patient tolerated procedure well, she was extubated and transferred to recovery room awake and in stable condition.    Raymon Avery MD , FACS   Diplomate American Board of Plastic Surgery  Diplomate American Board of Surgery  Adj. Assistant Professor of Surgery  Division of Plastic & Reconstructive Surgery   [facility]  Office: [phone]   9/12/2023 at 3:08 PM

## 2023-09-12 NOTE — DISCHARGE INSTRUCTIONS
[facility]  Home Care Following Anesthesia  For 24 hours after surgery:  Get plenty of rest.  A responsible adult must stay with you for at least 24 hours after you leave the surgery center.  Do not drive or use heavy equipment.  If you have weakness or tingling, don't drive or use heavy equipment until this feeling goes away.   Do not drink alcohol.   Avoid strenuous or risky activities.  Ask for help when climbing stairs.  You may feel lightheaded.  IF so, sit for a few minutes before standing.  Have someone help you get up.   If you have nausea (feel sick to your stomach): Drink only clear liquids such as apple juice, ginger ale, broth or 7-Up.  Rest may also help.  Be sure to drink enough fluids.  Move to a regular diet as you feel able.   You may have a slight fever.  Call the doctor if your fever is over 100 F (37.7 C) (taken under the tongue) or lasts longer than 24 hours.  You may have a dry mouth, a sore throat, muscle aches or trouble sleeping. These should go away after 24 hours.  Do not make important or legal decisions.   It is recommended to avoid smoking.               Tips for taking pain medications  To get the best pain relief possible, remember these points:  Take pain medications as directed, before pain becomes severe.  Pain medication can upset your stomach: taking it with food may help.  Constipation is a common side effect of pain medication. Drink plenty of  fluids.  Eat foods high in fiber. Take a stool softener if recommended by your doctor or pharmacist.  Do not drink alcohol, drive or operate machinery while taking pain medications.  Ask about other ways to control pain, such as with heat, ice or relaxation.    Tylenol/Acetaminophen Consumption    If you feel your pain relief is insufficient, you may take Tylenol/Acetaminophen in addition to your narcotic pain medication.   Be careful not to exceed 4,000 mg of Tylenol/Acetaminophen in a 24 hour  period from all sources.  If you are taking extra strength Tylenol/acetaminophen (500 mg), the maximum dose is 8 tablets in 24 hours.  If you are taking regular strength acetaminophen (325 mg), the maximum dose is 12 tablets in 24 hours.    Call a doctor for any of the following:  Signs of infection (fever, growing tenderness at the surgery site, a large amount of drainage or bleeding, severe pain, foul-smelling drainage, redness, swelling).  It has been over 8 to 10 hours since surgery and you are still not able to urinate (pass water).  Headache for over 24 hours.  Numbness, tingling or weakness the day after surgery (if you had spinal anesthesia).  Signs of Covid-19 infection (temperature over 100 degrees, shortness of breath, cough, loss of taste/smell, generalized body aches, persistent headache, chills, sore throat, nausea/vomiting/diarrhea)  Your doctor is:       Dr. Raymon Avery, Plastic Surgery: [phone]               Or dial [phone] and ask for the resident on call for:  Plastics  For emergency care, call the:  [place] [facility]:  [phone] (TTY for hearing impaired: [phone])

## 2023-09-12 NOTE — ANESTHESIA CARE TRANSFER NOTE
Patient: Marcelina Mcginnis    Procedure: Procedure(s):  fat graft breast,  bilateral breast fat grafting.       Diagnosis: Status post bilateral breast reconstruction [Z98.890]  Diagnosis Additional Information: No value filed.    Anesthesia Type:   General     Note:  Anesthesia Care Transfer Notewriter  Vitals:  Vitals Value Taken Time   /73 09/12/23 1050   Temp 97.6    Pulse 65 09/12/23 1051   Resp 17 09/12/23 1051   SpO2 99 % 09/12/23 1051   Vitals shown include unvalidated device data.    Electronically Signed By: Opal Rdz, JETT CRNA  September 12, 2023  10:52 AM

## 2023-09-15 ENCOUNTER — OFFICE VISIT (OUTPATIENT)
Dept: PLASTIC SURGERY | Facility: AMBULATORY SURGERY CENTER | Age: 48
End: 2023-09-15
Payer: COMMERCIAL

## 2023-09-15 DIAGNOSIS — Z98.890 STATUS POST BILATERAL BREAST RECONSTRUCTION: Primary | ICD-10-CM

## 2023-09-15 PROCEDURE — 99024 POSTOP FOLLOW-UP VISIT: CPT | Performed by: PLASTIC SURGERY

## 2023-09-15 NOTE — PROGRESS NOTES
Marcelina is a 48 years old lady status post fat grafting to bilateral breast.  She is 3 days out from surgery.  She is doing well.    At the physical exam, patient present with good contour and volume bilateral breast.  No evidence of hematoma.    Donor sites on her back with some bruising and ecchymosis which is expected after fat harvesting in the area.  Otherwise no evidence of hematoma.    Plan: Continue with compression garment, may have regular showers, follow-up with me in 3 weeks for removal of the stitches on the donor and recipient site areas.    Raymon Avery MD , FACS   Diplomate American Board of Plastic Surgery  Diplomate American Board of Surgery  Adj. Assistant Professor of Surgery  Division of Plastic & Reconstructive Surgery   [facility]  Office: [phone]   9/15/2023 at 8:49 AM

## 2023-10-06 ENCOUNTER — OFFICE VISIT (OUTPATIENT)
Dept: PLASTIC SURGERY | Facility: AMBULATORY SURGERY CENTER | Age: 48
End: 2023-10-06
Payer: COMMERCIAL

## 2023-10-06 DIAGNOSIS — Z98.890 STATUS POST BILATERAL BREAST RECONSTRUCTION: Primary | ICD-10-CM

## 2023-10-06 PROCEDURE — 99024 POSTOP FOLLOW-UP VISIT: CPT | Performed by: PLASTIC SURGERY

## 2023-10-06 NOTE — PROGRESS NOTES
Marcelina is a 48 years old lady status post fat grafting to bilateral breast. She is 3 weeks out from surgery. She continues to do well.    At the physical exam, patient presents with good contour and volume on bilateral breasts, especially in her upper poles. No evidence of seroma at this time.  Good shape on her breasts, with no evidence of rippling from her implants.  Stitches has been removed, including the ones on her back.    Plan: Continue with compression garment, continue with lymphatic drainage massage and follow-up with me in 6 weeks.  She is doing excellent from plastic surgery standpoint.    Raymon Avery MD , FACS   Diplomate American Board of Plastic Surgery  Diplomate American Board of Surgery  Adj. Assistant Professor of Surgery  Division of Plastic & Reconstructive Surgery   [facility]  Office: [phone]   10/6/2023 at 5:52 PM

## 2023-11-14 ENCOUNTER — OFFICE VISIT (OUTPATIENT)
Dept: PLASTIC SURGERY | Facility: AMBULATORY SURGERY CENTER | Age: 48
End: 2023-11-14
Payer: COMMERCIAL

## 2023-11-14 DIAGNOSIS — Z98.890 STATUS POST BILATERAL BREAST RECONSTRUCTION: Primary | ICD-10-CM

## 2023-11-14 PROCEDURE — 99024 POSTOP FOLLOW-UP VISIT: CPT | Performed by: PLASTIC SURGERY

## 2023-11-14 NOTE — PROGRESS NOTES
Marcelina is a 48 years old lady status post fat grafting to bilateral breast. She is 2 months out from surgery.  She is feeling great.    At the physical exam, patient present with excellent shape and symmetry of her breast bilaterally.  There is no evidence of rippling.  Well-healed mastectomy scars.  There is good upper pole fullness.                          Plan: Continue with her normal activities, scar massage with cocoa butter lotion and follow-up with me in a year.    We discussed, that may be future she would like to have bigger implants.  We will plan for that in the future.    Otherwise, patient is doing excellent from plastic surgery standpoint.    Raymon Avery MD , FACS   Diplomate American Board of Plastic Surgery  Diplomate American Board of Surgery  Adj. Assistant Professor of Surgery  Division of Plastic & Reconstructive Surgery   [facility]  Office: [phone]   11/14/2023 at 3:55 PM

## 2023-11-14 NOTE — NURSING NOTE
Patient here today for S/P bilateral breast fat grafting from back and flanks on 09/12/2023.     The patient is doing well.     Hilary Walton RN on 11/14/2023 at 3:34 PM

## 2024-06-16 ENCOUNTER — HEALTH MAINTENANCE LETTER (OUTPATIENT)
Age: 49
End: 2024-06-16

## 2025-06-21 ENCOUNTER — HEALTH MAINTENANCE LETTER (OUTPATIENT)
Age: 50
End: 2025-06-21

## (undated) DEVICE — GLOVE BIOGEL PI MICRO SZ 6.5 48565

## (undated) DEVICE — TUBING SUCTION MEDI-VAC 1/4"X20' N620A

## (undated) DEVICE — BLANKET BAIR HUGGER UNDERBODY AU63500

## (undated) DEVICE — CLEANER CAUTERY TIP V8101

## (undated) DEVICE — PREP CHLORAPREP 26ML TINTED ORANGE  260815

## (undated) DEVICE — Device

## (undated) DEVICE — LINEN TOWEL PACK X5 5464

## (undated) DEVICE — SUTURE VICRYL 0 SH UNDYED J418H

## (undated) DEVICE — HOLDER DRAINAGE BULB 0814-8220

## (undated) DEVICE — DRSG TEGADERM 4X4 3/4" 1626W

## (undated) DEVICE — BLADE KNIFE SURG 11 371111

## (undated) DEVICE — SUCTION TIP YANKAUER W/O VENT K86

## (undated) DEVICE — DRAIN BLAKE 15FR SIL 2229

## (undated) DEVICE — GLOVE BIOGEL PI INDICATOR 8.0 LF 41680

## (undated) DEVICE — SU SILK 2-0 FS-1 18" 685G

## (undated) DEVICE — GLOVE BIOGEL PI MICRO INDICATOR UNDERGLOVE SZ 7.0 48970

## (undated) DEVICE — MARKER SURG SKIN STRL 77734

## (undated) DEVICE — STPL SKIN 35W 6.9MM  PXW35

## (undated) DEVICE — SU SILK 2-0 SH 30" K833H

## (undated) DEVICE — SPONGE LAP 18X18" X8435

## (undated) DEVICE — DRSG PRIMAPORE 02X3" 7133

## (undated) DEVICE — SOL RINGERS LACTATED 1000ML BAG 2B2324X

## (undated) DEVICE — GOWN LG DISP 9515

## (undated) DEVICE — SOL NACL 0.9% IRRIG 1000ML BOTTLE 2F7124

## (undated) DEVICE — NEEDLE HYPO 22X1-1/2 SAFETY 305900

## (undated) DEVICE — BNDG ABDOMINAL BINDER 9X45-62" 79-89071

## (undated) DEVICE — SU PROLENE 6-0 P-1 18" 8697G

## (undated) DEVICE — DRAPE IOBAN INCISE 23X17" 6650EZ

## (undated) DEVICE — TUBING ASPIRATING BYRON 3/8"X12' PT-5558

## (undated) DEVICE — DRAIN RESERVOIR 100ML JP 0070740

## (undated) DEVICE — BNDG ABDOMINAL BINDER 09X46-62"

## (undated) DEVICE — CLOSURE SYS SKIN PREMIERPRO EXOFIN FUSION 4X22CM STRL 3472

## (undated) DEVICE — SUCTION MANIFOLD NEPTUNE 2 SYS 1 PORT 702-025-000

## (undated) DEVICE — UNIVERSAL DRAPE PACK 29118

## (undated) DEVICE — SUCTION MANIFOLD NEPTUNE 2 SYS 4 PORT 0702-020-000

## (undated) DEVICE — DRSG STERI STRIP 1/2X4" R1547

## (undated) DEVICE — SUTURE VICRYL+ 3-0 18 SH/CR UND VCP864

## (undated) DEVICE — SYR 50ML CATH TIP W/O NDL 309620

## (undated) DEVICE — TUBING IRRIGATION LIPOSUCTION 13' MRI COMP CG-INF-T

## (undated) DEVICE — BANDAGE ELASTIC VELCRO 6IN REB3016

## (undated) DEVICE — SU MONOCRYL 4-0 PS-2 27" UND Y426H

## (undated) DEVICE — SUTURE MONOCRYL+ 5-0 18IN P3 UND MCP493G

## (undated) DEVICE — PACK MINOR CUSTOM ASC

## (undated) DEVICE — CUSTOM PACK GEN MAJOR SBA5BGMHEA

## (undated) DEVICE — COLLECTION DEVICE SYSTEM TISSUE REVOLVE RV0001 4 PACK RV0004

## (undated) DEVICE — BLADE KNIFE SURG 10 371110

## (undated) DEVICE — SOL NACL 0.9% IRRIG 500ML BOTTLE 2F7123

## (undated) DEVICE — PITCHER STERILE 1000ML  SSK9004A

## (undated) DEVICE — DRAPE SHEET REV FOLD 3/4 9349

## (undated) DEVICE — SU MONOCRYL+ 4-0 18IN PS2 UND MCP496G

## (undated) DEVICE — SU STRATAFIX MONOCRYL 4-0 SPIRAL PS-2 SXMP1B118

## (undated) DEVICE — PREP POVIDONE IODINE SOLUTION 10% 4OZ BOTTLE 29906-004

## (undated) DEVICE — SYR 10ML LL W/O NDL

## (undated) DEVICE — LIGACLIP MEDIUM AESCULAP B2180-1

## (undated) DEVICE — GLOVE GAMMEX NEOPRENE ULTRA SZ 7.5 LF 8515

## (undated) DEVICE — ESU PENCIL SMOKE EVAC W/ROCKER SWITCH 0703-047-000

## (undated) DEVICE — GLOVE UNDER INDICATOR PI SZ 6.5 LF 41665

## (undated) DEVICE — ADH LIQUID MASTISOL TOPICAL VIAL 2-3ML 0523-48

## (undated) DEVICE — DECANTER VIAL 2006S

## (undated) DEVICE — SYR 10ML LL W/O NDL 302995

## (undated) DEVICE — KIT PROCEDURE SPY-PHI SGL HH9001

## (undated) DEVICE — A3 SUPPLIES- SEE NURSING INFO PAGE

## (undated) DEVICE — CATH TRAY FOLEY SURESTEP 16FR DRAIN BAG STATOCK A899916

## (undated) DEVICE — SU VICRYL+ 4-0 18IN P-3 UND VCP494G

## (undated) DEVICE — PLATE GROUNDING ADULT W/CORD 9165L

## (undated) DEVICE — TAPE TRANSPORE 2"X1.5YD 1534S-2

## (undated) DEVICE — SYR BULB IRRIG DOVER 60 ML LATEX FREE 67000

## (undated) DEVICE — GLOVE UNDER INDICATOR PI SZ 7.0 LF 41670

## (undated) DEVICE — SOL ISOPROPYL RUBBING ALCOHOL USP 70% 4OZ HDX-20 I0020

## (undated) DEVICE — GLOVE SURG PI ULTRA TOUCH M SZ 6-1/2 LF

## (undated) DEVICE — PREP CHLORAPREP 26ML TINTED HI-LITE ORANGE 930815

## (undated) DEVICE — GLOVE BIOGEL PI MICRO INDICATOR UNDERGLOVE SZ 8.0 48980

## (undated) DEVICE — DRSG BIOPATCH GERMICIDAL SPLIT SPONGE 4MM MED 4150

## (undated) DEVICE — BLADE KNIFE SURG 15 371115

## (undated) DEVICE — DRSG TEGADERM 8X12" 1629

## (undated) DEVICE — DRAPE CHEST 100X72X124 89227

## (undated) DEVICE — DRSG ABDOMINAL 07 1/2X8" 7197D

## (undated) DEVICE — ESU ELEC BLADE 6" COATED E1450-6

## (undated) DEVICE — SOL WATER IRRIG 1000ML BOTTLE 2F7114

## (undated) RX ORDER — INDOCYANINE GREEN AND WATER 25 MG
KIT INJECTION
Status: DISPENSED
Start: 2022-11-14

## (undated) RX ORDER — LIDOCAINE HYDROCHLORIDE 10 MG/ML
INJECTION, SOLUTION EPIDURAL; INFILTRATION; INTRACAUDAL; PERINEURAL
Status: DISPENSED
Start: 2022-11-14

## (undated) RX ORDER — ACETAMINOPHEN 325 MG/1
TABLET ORAL
Status: DISPENSED
Start: 2023-05-11

## (undated) RX ORDER — PROPOFOL 10 MG/ML
INJECTION, EMULSION INTRAVENOUS
Status: DISPENSED
Start: 2023-09-12

## (undated) RX ORDER — FENTANYL CITRATE 50 UG/ML
INJECTION, SOLUTION INTRAMUSCULAR; INTRAVENOUS
Status: DISPENSED
Start: 2023-09-12

## (undated) RX ORDER — GLYCOPYRROLATE 0.2 MG/ML
INJECTION INTRAMUSCULAR; INTRAVENOUS
Status: DISPENSED
Start: 2022-11-14

## (undated) RX ORDER — PROPOFOL 10 MG/ML
INJECTION, EMULSION INTRAVENOUS
Status: DISPENSED
Start: 2023-05-11

## (undated) RX ORDER — PROPOFOL 10 MG/ML
INJECTION, EMULSION INTRAVENOUS
Status: DISPENSED
Start: 2022-11-14

## (undated) RX ORDER — KETAMINE HYDROCHLORIDE 10 MG/ML
INJECTION INTRAMUSCULAR; INTRAVENOUS
Status: DISPENSED
Start: 2022-11-14

## (undated) RX ORDER — GABAPENTIN 300 MG/1
CAPSULE ORAL
Status: DISPENSED
Start: 2023-09-12

## (undated) RX ORDER — KETOROLAC TROMETHAMINE 30 MG/ML
INJECTION, SOLUTION INTRAMUSCULAR; INTRAVENOUS
Status: DISPENSED
Start: 2023-09-12

## (undated) RX ORDER — KETOROLAC TROMETHAMINE 30 MG/ML
INJECTION, SOLUTION INTRAMUSCULAR; INTRAVENOUS
Status: DISPENSED
Start: 2023-05-11

## (undated) RX ORDER — FENTANYL CITRATE-0.9 % NACL/PF 10 MCG/ML
PLASTIC BAG, INJECTION (ML) INTRAVENOUS
Status: DISPENSED
Start: 2023-09-12

## (undated) RX ORDER — LIDOCAINE HYDROCHLORIDE 10 MG/ML
INJECTION, SOLUTION EPIDURAL; INFILTRATION; INTRACAUDAL; PERINEURAL
Status: DISPENSED
Start: 2023-05-11

## (undated) RX ORDER — FENTANYL CITRATE 50 UG/ML
INJECTION, SOLUTION INTRAMUSCULAR; INTRAVENOUS
Status: DISPENSED
Start: 2023-05-11

## (undated) RX ORDER — CEFAZOLIN SODIUM 2 G/50ML
SOLUTION INTRAVENOUS
Status: DISPENSED
Start: 2023-05-11

## (undated) RX ORDER — DEXAMETHASONE SODIUM PHOSPHATE 4 MG/ML
INJECTION, SOLUTION INTRA-ARTICULAR; INTRALESIONAL; INTRAMUSCULAR; INTRAVENOUS; SOFT TISSUE
Status: DISPENSED
Start: 2023-05-11

## (undated) RX ORDER — HYDROMORPHONE HYDROCHLORIDE 1 MG/ML
INJECTION, SOLUTION INTRAMUSCULAR; INTRAVENOUS; SUBCUTANEOUS
Status: DISPENSED
Start: 2023-05-11

## (undated) RX ORDER — HYDROMORPHONE HYDROCHLORIDE 1 MG/ML
INJECTION, SOLUTION INTRAMUSCULAR; INTRAVENOUS; SUBCUTANEOUS
Status: DISPENSED
Start: 2023-09-12

## (undated) RX ORDER — CEFAZOLIN SODIUM 2 G/50ML
SOLUTION INTRAVENOUS
Status: DISPENSED
Start: 2023-09-12

## (undated) RX ORDER — EPINEPHRINE 1 MG/ML
INJECTION, SOLUTION, CONCENTRATE INTRAVENOUS
Status: DISPENSED
Start: 2023-09-12

## (undated) RX ORDER — DEXAMETHASONE SODIUM PHOSPHATE 4 MG/ML
INJECTION, SOLUTION INTRA-ARTICULAR; INTRALESIONAL; INTRAMUSCULAR; INTRAVENOUS; SOFT TISSUE
Status: DISPENSED
Start: 2023-09-12

## (undated) RX ORDER — ACETAMINOPHEN 325 MG/1
TABLET ORAL
Status: DISPENSED
Start: 2023-09-12

## (undated) RX ORDER — GLYCOPYRROLATE 0.2 MG/ML
INJECTION INTRAMUSCULAR; INTRAVENOUS
Status: DISPENSED
Start: 2023-05-11

## (undated) RX ORDER — GLYCOPYRROLATE 0.2 MG/ML
INJECTION INTRAMUSCULAR; INTRAVENOUS
Status: DISPENSED
Start: 2023-09-12

## (undated) RX ORDER — OXYCODONE HYDROCHLORIDE 5 MG/1
TABLET ORAL
Status: DISPENSED
Start: 2023-09-12

## (undated) RX ORDER — DEXAMETHASONE SODIUM PHOSPHATE 10 MG/ML
INJECTION, SOLUTION INTRAMUSCULAR; INTRAVENOUS
Status: DISPENSED
Start: 2022-11-14

## (undated) RX ORDER — FENTANYL CITRATE 50 UG/ML
INJECTION, SOLUTION INTRAMUSCULAR; INTRAVENOUS
Status: DISPENSED
Start: 2022-11-14

## (undated) RX ORDER — ONDANSETRON 2 MG/ML
INJECTION INTRAMUSCULAR; INTRAVENOUS
Status: DISPENSED
Start: 2023-09-12

## (undated) RX ORDER — EPINEPHRINE 1 MG/ML
INJECTION, SOLUTION INTRAMUSCULAR; SUBCUTANEOUS
Status: DISPENSED
Start: 2023-05-11

## (undated) RX ORDER — ONDANSETRON 2 MG/ML
INJECTION INTRAMUSCULAR; INTRAVENOUS
Status: DISPENSED
Start: 2023-05-11